# Patient Record
Sex: MALE | Race: WHITE
[De-identification: names, ages, dates, MRNs, and addresses within clinical notes are randomized per-mention and may not be internally consistent; named-entity substitution may affect disease eponyms.]

---

## 2019-01-05 ENCOUNTER — HOSPITAL ENCOUNTER (INPATIENT)
Dept: HOSPITAL 88 - ER | Age: 49
LOS: 4 days | Discharge: HOME | DRG: 871 | End: 2019-01-09
Attending: INTERNAL MEDICINE | Admitting: INTERNAL MEDICINE
Payer: SELF-PAY

## 2019-01-05 VITALS — DIASTOLIC BLOOD PRESSURE: 50 MMHG | SYSTOLIC BLOOD PRESSURE: 100 MMHG

## 2019-01-05 VITALS — DIASTOLIC BLOOD PRESSURE: 55 MMHG | SYSTOLIC BLOOD PRESSURE: 92 MMHG

## 2019-01-05 VITALS — SYSTOLIC BLOOD PRESSURE: 81 MMHG | DIASTOLIC BLOOD PRESSURE: 47 MMHG

## 2019-01-05 VITALS — DIASTOLIC BLOOD PRESSURE: 60 MMHG | SYSTOLIC BLOOD PRESSURE: 99 MMHG

## 2019-01-05 VITALS — SYSTOLIC BLOOD PRESSURE: 113 MMHG | DIASTOLIC BLOOD PRESSURE: 62 MMHG

## 2019-01-05 VITALS — HEIGHT: 75 IN | BODY MASS INDEX: 28.6 KG/M2 | WEIGHT: 230.03 LBS

## 2019-01-05 VITALS — DIASTOLIC BLOOD PRESSURE: 68 MMHG | SYSTOLIC BLOOD PRESSURE: 126 MMHG

## 2019-01-05 VITALS — DIASTOLIC BLOOD PRESSURE: 53 MMHG | SYSTOLIC BLOOD PRESSURE: 91 MMHG

## 2019-01-05 VITALS — DIASTOLIC BLOOD PRESSURE: 66 MMHG | SYSTOLIC BLOOD PRESSURE: 96 MMHG

## 2019-01-05 VITALS — DIASTOLIC BLOOD PRESSURE: 46 MMHG | SYSTOLIC BLOOD PRESSURE: 83 MMHG

## 2019-01-05 VITALS — DIASTOLIC BLOOD PRESSURE: 65 MMHG | SYSTOLIC BLOOD PRESSURE: 101 MMHG

## 2019-01-05 DIAGNOSIS — Z82.49: ICD-10-CM

## 2019-01-05 DIAGNOSIS — K92.2: ICD-10-CM

## 2019-01-05 DIAGNOSIS — R10.84: ICD-10-CM

## 2019-01-05 DIAGNOSIS — E87.2: ICD-10-CM

## 2019-01-05 DIAGNOSIS — R65.21: ICD-10-CM

## 2019-01-05 DIAGNOSIS — N17.9: ICD-10-CM

## 2019-01-05 DIAGNOSIS — G43.111: ICD-10-CM

## 2019-01-05 DIAGNOSIS — N30.00: ICD-10-CM

## 2019-01-05 DIAGNOSIS — I10: ICD-10-CM

## 2019-01-05 DIAGNOSIS — F11.20: ICD-10-CM

## 2019-01-05 DIAGNOSIS — A41.9: Primary | ICD-10-CM

## 2019-01-05 DIAGNOSIS — D64.9: ICD-10-CM

## 2019-01-05 DIAGNOSIS — Z87.891: ICD-10-CM

## 2019-01-05 DIAGNOSIS — K59.00: ICD-10-CM

## 2019-01-05 DIAGNOSIS — Z91.018: ICD-10-CM

## 2019-01-05 DIAGNOSIS — Z91.048: ICD-10-CM

## 2019-01-05 DIAGNOSIS — F10.20: ICD-10-CM

## 2019-01-05 DIAGNOSIS — K74.60: ICD-10-CM

## 2019-01-05 DIAGNOSIS — E86.0: ICD-10-CM

## 2019-01-05 DIAGNOSIS — Z86.19: ICD-10-CM

## 2019-01-05 DIAGNOSIS — Z88.8: ICD-10-CM

## 2019-01-05 LAB
ALBUMIN SERPL-MCNC: 3.7 G/DL (ref 3.5–5)
ALBUMIN/GLOB SERPL: 1.2 {RATIO} (ref 0.8–2)
ALP SERPL-CCNC: 58 IU/L (ref 40–150)
ALT SERPL-CCNC: 22 IU/L (ref 0–55)
AMPHETAMINES UR QL SCN>1000 NG/ML: NEGATIVE
AMYLASE SERPL-CCNC: 88 U/L (ref 25–125)
ANION GAP SERPL CALC-SCNC: 23.1 MMOL/L (ref 8–16)
APAP SERPL SCN-MCNC: 7 UG/ML (ref 10–30)
BACTERIA URNS QL MICRO: (no result) /HPF
BASOPHILS # BLD AUTO: 0.1 10*3/UL (ref 0–0.1)
BASOPHILS NFR BLD AUTO: 0.5 % (ref 0–1)
BENZODIAZ UR QL SCN: POSITIVE
BILIRUB UR QL: (no result)
BNP BLD-MCNC: 22.3 PG/ML (ref 0–100)
BUN SERPL-MCNC: 66 MG/DL (ref 7–26)
BUN/CREAT SERPL: 8 (ref 6–25)
CALCIUM SERPL-MCNC: 8.6 MG/DL (ref 8.4–10.2)
CHLORIDE SERPL-SCNC: 97 MMOL/L (ref 98–107)
CK MB SERPL-MCNC: 3.6 NG/ML (ref 0–5)
CK MB SERPL-MCNC: 4.4 NG/ML (ref 0–5)
CK SERPL-CCNC: 287 IU/L (ref 30–200)
CK SERPL-CCNC: 385 IU/L (ref 30–200)
CLARITY UR: (no result)
CO2 SERPL-SCNC: 21 MMOL/L (ref 22–29)
COLOR UR: YELLOW
CREAT UR-MCNC: 496.99 MG/DL (ref 63–166)
DEPRECATED APTT PLAS QN: 27 SECONDS (ref 23.8–35.5)
DEPRECATED INR PLAS: 0.95
DEPRECATED NEUTROPHILS # BLD AUTO: 6.9 10*3/UL (ref 2.1–6.9)
DEPRECATED RBC URNS MANUAL-ACNC: (no result) /HPF (ref 0–5)
EGFRCR SERPLBLD CKD-EPI 2021: 7 ML/MIN (ref 60–?)
EOSINOPHIL # BLD AUTO: 0.2 10*3/UL (ref 0–0.4)
EOSINOPHIL NFR BLD AUTO: 2.3 % (ref 0–6)
EPI CELLS URNS QL MICRO: (no result) /LPF
ERYTHROCYTE [DISTWIDTH] IN CORD BLOOD: 12.8 % (ref 11.7–14.4)
GLOBULIN PLAS-MCNC: 3.1 G/DL (ref 2.3–3.5)
GLUCOSE SERPLBLD-MCNC: 121 MG/DL (ref 74–118)
HCT VFR BLD AUTO: 34 % (ref 38.2–49.6)
HGB BLD-MCNC: 11.6 G/DL (ref 14–18)
KETONES UR QL STRIP.AUTO: (no result)
LEUKOCYTE ESTERASE UR QL STRIP.AUTO: NEGATIVE
LIPASE SERPL-CCNC: 50 U/L (ref 8–78)
LYMPHOCYTES # BLD: 1.3 10*3/UL (ref 1–3.2)
LYMPHOCYTES NFR BLD AUTO: 13.9 % (ref 18–39.1)
MAGNESIUM SERPL-MCNC: 2.4 MG/DL (ref 1.3–2.1)
MCH RBC QN AUTO: 31.4 PG (ref 28–32)
MCHC RBC AUTO-ENTMCNC: 34.1 G/DL (ref 31–35)
MCV RBC AUTO: 91.9 FL (ref 81–99)
MONOCYTES # BLD AUTO: 0.8 10*3/UL (ref 0.2–0.8)
MONOCYTES NFR BLD AUTO: 8.9 % (ref 4.4–11.3)
MUCOUS THREADS URNS QL MICRO: (no result)
NEUTS SEG NFR BLD AUTO: 74.1 % (ref 38.7–80)
NITRITE UR QL STRIP.AUTO: NEGATIVE
PCP UR QL SCN: NEGATIVE
PLATELET # BLD AUTO: 252 X10E3/UL (ref 140–360)
POTASSIUM SERPL-SCNC: 4.1 MMOL/L (ref 3.5–5.1)
PROT UR QL STRIP.AUTO: (no result)
PROTHROMBIN TIME: 13.6 SECONDS (ref 11.9–14.5)
RBC # BLD AUTO: 3.7 X10E6/UL (ref 4.3–5.7)
SALICYLATES SERPL-MCNC: < 5 MG/DL (ref 0–30)
SODIUM SERPL-SCNC: 137 MMOL/L (ref 136–145)
SODIUM UR-SCNC: 27 MMOL/L
SP GR UR STRIP: 1.03 (ref 1.01–1.02)
UROBILINOGEN UR STRIP-MCNC: 0.2 MG/DL (ref 0.2–1)
WBC #/AREA URNS HPF: (no result) /HPF (ref 0–5)

## 2019-01-05 PROCEDURE — 80048 BASIC METABOLIC PNL TOTAL CA: CPT

## 2019-01-05 PROCEDURE — 93005 ELECTROCARDIOGRAM TRACING: CPT

## 2019-01-05 PROCEDURE — 84436 ASSAY OF TOTAL THYROXINE: CPT

## 2019-01-05 PROCEDURE — 76770 US EXAM ABDO BACK WALL COMP: CPT

## 2019-01-05 PROCEDURE — 83735 ASSAY OF MAGNESIUM: CPT

## 2019-01-05 PROCEDURE — 84466 ASSAY OF TRANSFERRIN: CPT

## 2019-01-05 PROCEDURE — 82607 VITAMIN B-12: CPT

## 2019-01-05 PROCEDURE — 36415 COLL VENOUS BLD VENIPUNCTURE: CPT

## 2019-01-05 PROCEDURE — 71045 X-RAY EXAM CHEST 1 VIEW: CPT

## 2019-01-05 PROCEDURE — 80329 ANALGESICS NON-OPIOID 1 OR 2: CPT

## 2019-01-05 PROCEDURE — 82570 ASSAY OF URINE CREATININE: CPT

## 2019-01-05 PROCEDURE — 84479 ASSAY OF THYROID (T3 OR T4): CPT

## 2019-01-05 PROCEDURE — 84443 ASSAY THYROID STIM HORMONE: CPT

## 2019-01-05 PROCEDURE — 84484 ASSAY OF TROPONIN QUANT: CPT

## 2019-01-05 PROCEDURE — 80307 DRUG TEST PRSMV CHEM ANLYZR: CPT

## 2019-01-05 PROCEDURE — 83540 ASSAY OF IRON: CPT

## 2019-01-05 PROCEDURE — 82150 ASSAY OF AMYLASE: CPT

## 2019-01-05 PROCEDURE — 82270 OCCULT BLOOD FECES: CPT

## 2019-01-05 PROCEDURE — 83605 ASSAY OF LACTIC ACID: CPT

## 2019-01-05 PROCEDURE — 51700 IRRIGATION OF BLADDER: CPT

## 2019-01-05 PROCEDURE — 82553 CREATINE MB FRACTION: CPT

## 2019-01-05 PROCEDURE — 99285 EMERGENCY DEPT VISIT HI MDM: CPT

## 2019-01-05 PROCEDURE — 85045 AUTOMATED RETICULOCYTE COUNT: CPT

## 2019-01-05 PROCEDURE — 94660 CPAP INITIATION&MGMT: CPT

## 2019-01-05 PROCEDURE — 74176 CT ABD & PELVIS W/O CONTRAST: CPT

## 2019-01-05 PROCEDURE — 85730 THROMBOPLASTIN TIME PARTIAL: CPT

## 2019-01-05 PROCEDURE — 87493 C DIFF AMPLIFIED PROBE: CPT

## 2019-01-05 PROCEDURE — 81001 URINALYSIS AUTO W/SCOPE: CPT

## 2019-01-05 PROCEDURE — 87040 BLOOD CULTURE FOR BACTERIA: CPT

## 2019-01-05 PROCEDURE — 85025 COMPLETE CBC W/AUTO DIFF WBC: CPT

## 2019-01-05 PROCEDURE — 87086 URINE CULTURE/COLONY COUNT: CPT

## 2019-01-05 PROCEDURE — 82550 ASSAY OF CK (CPK): CPT

## 2019-01-05 PROCEDURE — 83880 ASSAY OF NATRIURETIC PEPTIDE: CPT

## 2019-01-05 PROCEDURE — 84300 ASSAY OF URINE SODIUM: CPT

## 2019-01-05 PROCEDURE — 80320 DRUG SCREEN QUANTALCOHOLS: CPT

## 2019-01-05 PROCEDURE — 85610 PROTHROMBIN TIME: CPT

## 2019-01-05 PROCEDURE — 80053 COMPREHEN METABOLIC PANEL: CPT

## 2019-01-05 PROCEDURE — 70450 CT HEAD/BRAIN W/O DYE: CPT

## 2019-01-05 PROCEDURE — 83690 ASSAY OF LIPASE: CPT

## 2019-01-05 RX ADMIN — DEXTROSE AND SODIUM CHLORIDE SCH MLS/HR: 5; 900 INJECTION, SOLUTION INTRAVENOUS at 22:15

## 2019-01-05 RX ADMIN — CEFEPIME SCH MLS/HR: 1 INJECTION, SOLUTION INTRAVENOUS at 16:00

## 2019-01-05 NOTE — NUR
Patient arrived from ER, pt has altered mentation and shakiness, was sent here from alcohol 
detox center. No order for Benzodiazepines from ER.

## 2019-01-05 NOTE — XMS REPORT
Clinical Summary

                             Created on: 2019



JC PRESSLEY

External Reference #: [id number]

: 1970

Sex: Male



Demographics







                          Address                   [address] [place]  [address]

 

                          Home Phone                [phone]

 

                          Preferred Language        Unknown

 

                          Marital Status            

 

                          Buddhist Affiliation     PRS

 

                          Race                      Unknown

 

                          Ethnic Group              Unknown





Author







                          Author                    [facility]

 

                          Organization              [facility]

 

                          Address                   Unknown

 

                          Phone                     Unavailable







Support







                Name            Relationship    Address         Phone

 

                    Jose Zeus        PEREZ                [address]

[place]  [address]                      [phone]







Care Team Providers







                    Care Team Member Name    Role                Phone

 

                          PCP                       Unavailable







Allergies

No Known Allergies



Medications







                          End Date                  Status



              Medication     Sig          Dispensed     Refills      Start  



                                         Date  

 

                                                    Active



              acetaminophen-codeine     Take 1 tablet     20 tablet     0              



                     (TYLENOL/CODEINE #3)     by mouth            8  



                           300-30 mg per             every 4 hours     



                           tabletIndications: Rib     as needed for     



                           pain on right side        Pain.     







Active Problems







 



                           Problem                   Noted Date

 

 



                           Rib pain on right side     2018







Encounters







                          Care Team                 Description



                     Date                Type                Specialty  

 

                                        



Francesco Bowen MD                    Rib pain on right side (Primary Dx)



                     2018          Emergency           Emergency Medicine  



                                         -    



                                         2018    



after 2018



Social History







                                        Date



                 Tobacco Use     Types           Packs/Day       Years Used 

 

                                         



                                         Current Every Day Smoker    

 

    



                                         Smokeless Tobacco: Never   



                                         Used   









   



                 Alcohol Use     Drinks/Week     oz/Week         Comments

 

   



                                         No   









 



                           Sex Assigned at Birth     Date Recorded

 

 



                                         Not on file 









                                        Industry



                           Job Start Date            Occupation 

 

                                        Not on file



                           Not on file               Not on file 









                                        Travel End



                           Travel History            Travel Start 

 





                                         No recent travel history available.







Last Filed Vital Signs







                                        Time Taken



                           Vital Sign                Reading 

 

                                        2018  6:00 AM CDT



                           Blood Pressure            168/98 

 

                                        2018  6:00 AM CDT



                           Pulse                     100 

 

                                        2018  6:00 AM CDT



                           Temperature               36.4 C (97.5 F) 

 

                                        2018  6:00 AM CDT



                           Respiratory Rate          18 

 

                                        2018  6:00 AM CDT



                           Oxygen Saturation         98% 

 

                                        -



                           Inhaled Oxygen            - 



                                         Concentration  

 

                                        -



                           Weight                    - 

 

                                        -



                           Height                    - 

 

                                        -



                           Body Mass Index           - 







Plan of Treatment







   



                 Health Maintenance     Due Date        Last Done       Comments

 

   



                           IMM Influenza Seasonal     10/01/2018  



                                         Oct to March (>/=19 yrs)   







Procedures







                                        Comments



                 Procedure Name     Priority        Date/Time       Associated Diagnosis 

 

                                        



Results for this procedure are in the results section.



                 XRAY RIBS UNILATERAL 2     STAT            2018      Rib pain on right side 



                           VIEWS                     4:13 AM CDT  

 

                                        



Results for this procedure are in the results section.



                 XRAY CHEST 2 VIEWS     STAT            2018      Rib pain on right side 



                                         6:17 PM CDT  



after 2018



Results

* XRAY RIBS UNILATERAL 2 VIEWS (2018  4:13 AM CDT)





 



                           Impressions               Performed At

 

 



                           IMPRESSION:               [facility]



                                         No displaced rib fracture. 



                                         Dictated By: Omi Hernandez MD, 2018 5:04 AM 



                                         I have reviewed the study and agree with the findings in this report. 



                                         Signed By: Erin Bennett MD, 2018 5:17 AM 









 



                           Narrative                 Performed At

 

 



                           EXAM: Right rib series: 4 view(s)     [facility]



                                         HISTORY: right rib pain 



                                         COMPARISON: Chest x-ray on 2018. 



                                         DISCUSSION: 



                                         No acute displaced fracture. 



                                         No pneumothorax. 



                                         Prominent right epicardial fat pad.Right middle lobe density and lungs 



                                         better assessed on prior chest two view xray. 



                                         The soft tissues are unremarkable. 









                                        Procedure Note

 

                                        



Interface, Rad/Mammog In - 2018  5:22 AM CDT



EXAM: Right rib series: 4 view(s)



HISTORY: right rib pain

COMPARISON: Chest x-ray on 2018.



DISCUSSION:



No acute displaced fracture.

No pneumothorax. 

Prominent right epicardial fat pad.  Right middle lobe density and lungs

better assessed on prior chest two view xray.

The soft tissues are unremarkable.



IMPRESSION

IMPRESSION:

No displaced rib fracture.



Dictated By: Omi Hernandez MD, 2018 5:04 AM



I have reviewed the study and agree with the findings in this report.



Signed By: Erin Bennett MD, 2018 5:17 AM











   



                 Performing Organization     Address         City/State/Zipcode     Phone Number

 

   



                                         [facility]   





* XRAY CHEST 2 VIEWS (2018  6:17 PM CDT)





 



                           Impressions               Performed At

 

 



                           IMPRESSION:               [facility]



                                         Mild irregularity of the right seventh rib may be due to mildly 



                                         displaced fracture. Recommend further evaluation with right-sided ribs 



                                         series. 



                                         Right middle lobe airspace opacity obscuring the right heart border due 



                                         to atelectasis, contusion, or pneumonia. Recommend follow-up chest 



                                         radiograph in 4-6 weeks. 



                                         Discussed findings with Dr. Menezes at 8:00 PM on 2018. 



                                         Dictated By: Omi Hernandez MD, 2018 8:02 PM 



                                         I have reviewed the study and agree with the findings in this report. 



                                         Signed By: Tiffani Veloz MD, 2018 8:03 PM 









 



                           Narrative                 Performed At

 

 



                           EXAMINATION:XRAY CHEST 2 VIEWS     [facility]



                                         INDICATION: right rib pain 



                                         COMPARISON:None 



                                          



                                         FINDINGS: 



                                         TUBES and LINES:None. 



                                         LUNGS:Right middle lobe airspace opacity obscuring the right heart 



                                         border. Bibasilar segmental atelectasis. 



                                         PLEURA:No pleural effusion or pneumothorax. 



                                         HEART AND MEDIASTINUM: Bilateral opacified basilar heart borders. The 



                                         cardiac silhouette silhouette is mildly enlarged. 



                                         BONES AND SOFT TISSUES:Mild irregularity of the lateral aspect of the 



                                         right seventh rib.Soft tissues are unremarkable. 



                                         UPPER ABDOMEN: No free air under the diaphragm. 









                                        Procedure Note

 

                                        



Interface, Rad/Mammog In - 2018  8:09 PM CDT



EXAMINATION:  XRAY CHEST 2 VIEWS    



INDICATION: right rib pain  



COMPARISON:  None

     

FINDINGS:

TUBES and LINES:  None.



LUNGS:  Right middle lobe airspace opacity obscuring the right heart

border. Bibasilar segmental atelectasis.



PLEURA:  No pleural effusion or pneumothorax.



HEART AND MEDIASTINUM: Bilateral opacified basilar heart borders. The

cardiac silhouette silhouette is mildly enlarged.



BONES AND SOFT TISSUES:  Mild irregularity of the lateral aspect of the

right seventh rib.  Soft tissues are unremarkable.



UPPER ABDOMEN: No free air under the diaphragm.    



IMPRESSION

IMPRESSION: 

Mild irregularity of the right seventh rib may be due to mildly

displaced fracture. Recommend further evaluation with right-sided ribs

series.



Right middle lobe airspace opacity obscuring the right heart border due

to atelectasis, contusion, or pneumonia. Recommend follow-up chest

radiograph in 4-6 weeks.





Discussed findings with Dr. Menezes at 8:00 PM on 2018.



Dictated By: Omi Hernandez MD, 2018 8:02 PM



I have reviewed the study and agree with the findings in this report.



Signed By: Tiffani Veloz MD, 2018 8:03 PM











   



                 Performing Organization     Address         City/State/Zipcode     Phone Number

 

   



                                         [facility]   





after 2018



Insurance







                                        Type



            Payer      Benefit     Subscriber ID     Effective     Phone      Address 



                           Plan /                    Dates   



                                         Group     

 

                                         



            [facility] SELF-PAY     [facility]       xxxxxxxxx     2018-P     [phone]     [address] [place] [address] 









     



            Guarantor Name     Account     Relation to     Date of     Phone      Billing Address



                     Type                Patient             Birth  

 

     



            Jc Pressley     Personal/F     Self       1970     [phone] [address]               (Home)              [place]



                           [phone]              [place] [address]



                                         (Work)

## 2019-01-05 NOTE — NUR
DR. PETERSON AT BEDSIDE TO TALK WITH PT REGARDING LAB RESULTS AND THE NEED TO 
INSERT LIMA CATHETER. PT VERBALIZES UNDERSTANDING. LIMA CATH INSERTED AS 
ORDERED. IVF'S INFUSING AS ORDERED. PT MEDICATED AS ORDERED. PT STILL HAVING 
SEVERE LUQ PAIN. DR. PETERSON AWARE OF THIS. PT INFORMED PAIN MED CAN'T BE GIVEN 
AT THIS TIME DUE TO LOW B/P 80/60'S. PT DRIFTING OFF TO SLEEP DURING 
CONVERSATION WITH MYSELF AND OTHER HOSPITAL STAFF. PT ENCOURAGED TO CONTINUE 
DRINKING CONTRAST FOR CT SCAN.

## 2019-01-05 NOTE — XMS REPORT
Clinical Summary

                             Created on: 2019



JC PRESSLEY

External Reference #: [id number]

: 1970

Sex: Male



Demographics







                          Address                   [address] [place]  [address]

 

                          Home Phone                [phone]

 

                          Preferred Language        Unknown

 

                          Marital Status            

 

                          Scientology Affiliation     PRS

 

                          Race                      Unknown

 

                          Ethnic Group              Unknown





Author







                          Author                    [facility]

 

                          Organization              [facility]

 

                          Address                   Unknown

 

                          Phone                     Unavailable







Support







                Name            Relationship    Address         Phone

 

                    Jose Zeus        PEREZ                [address]

[place]  [address]                      [phone]







Care Team Providers







                    Care Team Member Name    Role                Phone

 

                          PCP                       Unavailable







Allergies

No Known Allergies



Medications







                          End Date                  Status



              Medication     Sig          Dispensed     Refills      Start  



                                         Date  

 

                                                    Active



              acetaminophen-codeine     Take 1 tablet     20 tablet     0              



                     (TYLENOL/CODEINE #3)     by mouth            8  



                           300-30 mg per             every 4 hours     



                           tabletIndications: Rib     as needed for     



                           pain on right side        Pain.     







Active Problems







 



                           Problem                   Noted Date

 

 



                           Rib pain on right side     2018







Encounters







                          Care Team                 Description



                     Date                Type                Specialty  

 

                                        



Francesco Bowen MD                    Rib pain on right side (Primary Dx)



                     2018          Emergency           Emergency Medicine  



                                         -    



                                         2018    



after 2018



Social History







                                        Date



                 Tobacco Use     Types           Packs/Day       Years Used 

 

                                         



                                         Current Every Day Smoker    

 

    



                                         Smokeless Tobacco: Never   



                                         Used   









   



                 Alcohol Use     Drinks/Week     oz/Week         Comments

 

   



                                         No   









 



                           Sex Assigned at Birth     Date Recorded

 

 



                                         Not on file 









                                        Industry



                           Job Start Date            Occupation 

 

                                        Not on file



                           Not on file               Not on file 









                                        Travel End



                           Travel History            Travel Start 

 





                                         No recent travel history available.







Last Filed Vital Signs







                                        Time Taken



                           Vital Sign                Reading 

 

                                        2018  6:00 AM CDT



                           Blood Pressure            168/98 

 

                                        2018  6:00 AM CDT



                           Pulse                     100 

 

                                        2018  6:00 AM CDT



                           Temperature               36.4 C (97.5 F) 

 

                                        2018  6:00 AM CDT



                           Respiratory Rate          18 

 

                                        2018  6:00 AM CDT



                           Oxygen Saturation         98% 

 

                                        -



                           Inhaled Oxygen            - 



                                         Concentration  

 

                                        -



                           Weight                    - 

 

                                        -



                           Height                    - 

 

                                        -



                           Body Mass Index           - 







Plan of Treatment







   



                 Health Maintenance     Due Date        Last Done       Comments

 

   



                           IMM Influenza Seasonal     10/01/2018  



                                         Oct to March (>/=19 yrs)   







Procedures







                                        Comments



                 Procedure Name     Priority        Date/Time       Associated Diagnosis 

 

                                        



Results for this procedure are in the results section.



                 XRAY RIBS UNILATERAL 2     STAT            2018      Rib pain on right side 



                           VIEWS                     4:13 AM CDT  

 

                                        



Results for this procedure are in the results section.



                 XRAY CHEST 2 VIEWS     STAT            2018      Rib pain on right side 



                                         6:17 PM CDT  



after 2018



Results

* XRAY RIBS UNILATERAL 2 VIEWS (2018  4:13 AM CDT)





 



                           Impressions               Performed At

 

 



                           IMPRESSION:               [facility]



                                         No displaced rib fracture. 



                                         Dictated By: Omi Hernandez MD, 2018 5:04 AM 



                                         I have reviewed the study and agree with the findings in this report. 



                                         Signed By: Erin Bennett MD, 2018 5:17 AM 









 



                           Narrative                 Performed At

 

 



                           EXAM: Right rib series: 4 view(s)     [facility]



                                         HISTORY: right rib pain 



                                         COMPARISON: Chest x-ray on 2018. 



                                         DISCUSSION: 



                                         No acute displaced fracture. 



                                         No pneumothorax. 



                                         Prominent right epicardial fat pad.Right middle lobe density and lungs 



                                         better assessed on prior chest two view xray. 



                                         The soft tissues are unremarkable. 









                                        Procedure Note

 

                                        



Interface, Rad/Mammog In - 2018  5:22 AM CDT



EXAM: Right rib series: 4 view(s)



HISTORY: right rib pain

COMPARISON: Chest x-ray on 2018.



DISCUSSION:



No acute displaced fracture.

No pneumothorax. 

Prominent right epicardial fat pad.  Right middle lobe density and lungs

better assessed on prior chest two view xray.

The soft tissues are unremarkable.



IMPRESSION

IMPRESSION:

No displaced rib fracture.



Dictated By: Omi Hernandez MD, 2018 5:04 AM



I have reviewed the study and agree with the findings in this report.



Signed By: Erin Bennett MD, 2018 5:17 AM











   



                 Performing Organization     Address         City/State/Zipcode     Phone Number

 

   



                                         [facility]   





* XRAY CHEST 2 VIEWS (2018  6:17 PM CDT)





 



                           Impressions               Performed At

 

 



                           IMPRESSION:               [facility]



                                         Mild irregularity of the right seventh rib may be due to mildly 



                                         displaced fracture. Recommend further evaluation with right-sided ribs 



                                         series. 



                                         Right middle lobe airspace opacity obscuring the right heart border due 



                                         to atelectasis, contusion, or pneumonia. Recommend follow-up chest 



                                         radiograph in 4-6 weeks. 



                                         Discussed findings with Dr. Menezes at 8:00 PM on 2018. 



                                         Dictated By: Omi Hernandez MD, 2018 8:02 PM 



                                         I have reviewed the study and agree with the findings in this report. 



                                         Signed By: Tiffani Veloz MD, 2018 8:03 PM 









 



                           Narrative                 Performed At

 

 



                           EXAMINATION:XRAY CHEST 2 VIEWS     [facility]



                                         INDICATION: right rib pain 



                                         COMPARISON:None 



                                          



                                         FINDINGS: 



                                         TUBES and LINES:None. 



                                         LUNGS:Right middle lobe airspace opacity obscuring the right heart 



                                         border. Bibasilar segmental atelectasis. 



                                         PLEURA:No pleural effusion or pneumothorax. 



                                         HEART AND MEDIASTINUM: Bilateral opacified basilar heart borders. The 



                                         cardiac silhouette silhouette is mildly enlarged. 



                                         BONES AND SOFT TISSUES:Mild irregularity of the lateral aspect of the 



                                         right seventh rib.Soft tissues are unremarkable. 



                                         UPPER ABDOMEN: No free air under the diaphragm. 









                                        Procedure Note

 

                                        



Interface, Rad/Mammog In - 2018  8:09 PM CDT



EXAMINATION:  XRAY CHEST 2 VIEWS    



INDICATION: right rib pain  



COMPARISON:  None

     

FINDINGS:

TUBES and LINES:  None.



LUNGS:  Right middle lobe airspace opacity obscuring the right heart

border. Bibasilar segmental atelectasis.



PLEURA:  No pleural effusion or pneumothorax.



HEART AND MEDIASTINUM: Bilateral opacified basilar heart borders. The

cardiac silhouette silhouette is mildly enlarged.



BONES AND SOFT TISSUES:  Mild irregularity of the lateral aspect of the

right seventh rib.  Soft tissues are unremarkable.



UPPER ABDOMEN: No free air under the diaphragm.    



IMPRESSION

IMPRESSION: 

Mild irregularity of the right seventh rib may be due to mildly

displaced fracture. Recommend further evaluation with right-sided ribs

series.



Right middle lobe airspace opacity obscuring the right heart border due

to atelectasis, contusion, or pneumonia. Recommend follow-up chest

radiograph in 4-6 weeks.





Discussed findings with Dr. Menezes at 8:00 PM on 2018.



Dictated By: Omi Hernandez MD, 2018 8:02 PM



I have reviewed the study and agree with the findings in this report.



Signed By: Tiffani Veloz MD, 2018 8:03 PM











   



                 Performing Organization     Address         City/State/Zipcode     Phone Number

 

   



                                         [facility]   





after 2018



Insurance







                                        Type



            Payer      Benefit     Subscriber ID     Effective     Phone      Address 



                           Plan /                    Dates   



                                         Group     

 

                                         



            [facility] SELF-PAY     [facility]       xxxxxxxxx     2018-P     [phone]     [address] [place] [address] 









     



            Guarantor Name     Account     Relation to     Date of     Phone      Billing Address



                     Type                Patient             Birth  

 

     



            Jc Pressley     Personal/F     Self       1970     [phone] [address]               (Home)              [employer]



                           [phone]              [place] [address]



                                         (Work)

## 2019-01-05 NOTE — XMS REPORT
Clinical Summary

                             Created on: 2019



Casey Garciaaj Sudhir

External Reference #: [id number]

: 1970

Sex: Male



Demographics







                          Address                   [address] [place]  [address]

 

                          Home Phone                [phone]

 

                          Preferred Language        English

 

                          Marital Status            

 

                          Mandaeism Affiliation     Unknown

 

                          Race                      White

 

                          Ethnic Group              Non-





Author







                          Author                    [facility]

 

                          Organization              [facility]

 

                          Address                   Unknown

 

                          Phone                     Unavailable







Support







                Name            Relationship    Address         Phone

 

                Marry Garcia    [employer]            Unknown         [phone]







Care Team Providers







                    Care Team Member Name    Role                Phone

 

                    Gilford, Timberly MD    PCP                 [phone]







Allergies







                                        Comments



                 Active Allergy     Reactions       Severity        Noted Date 

 

                                        



"severe depression"



                 Rofecoxib       Other (See      High            2018 



                                         Comments)   







Medications







                          End Date                  Status



              Medication     Sig          Dispensed     Refills      Start  



                                         Date  

 

                          2019                Active



              lisinopril     Take 2       60 tablet     0              



                     (PRINIVIL,ZESTRIL) 20 mg     tablets (40         8  



                           tablet                    mg total) by     



                                         mouth daily     



                                         for 30 days.     

 

                          2019                Active



              thiamine (vitamin B-1)     Take 1 tablet     30 tablet     0              



                     100 MG tablet       (100 mg             8  



                                         total) by     



                                         mouth daily     



                                         for 30 days.     

 

                          2018                Discontinued



                 clonIDINE (CATAPRES) 0.1     TAKE 1 TABLET      2                 



                     MG tablet           BY MOUTH            8  



                                         EVERY DAY AS     



                                         NEEDED FOR     



                                         BLOOD     



                                         PRESSURE     



                                         >170/110     

 

                          2018                Discontinued



                     dextroamphetamine-ampheta     Adderall            0   



                                         mine (ADDERALL) 20 mg      



                                         tablet      

 

                          2018                Discontinued



                     lisinopril          Take 20 mg by       0   



                           (PRINIVIL,ZESTRIL) 20 mg     mouth daily.     



                                         tablet      

 

                          10/09/2018                



              sucralfate (CARAFATE) 100     Take 10 mL (1     1200 mL      0              



                     mg/mL suspension     g total) by         8  



                                         mouth 4     



                                         (four) times     



                                         a day for 30     



                                         days.     

 

                          10/09/2018                



              lansoprazole (PREVACID)     Take 1       30 capsule     0              



                     30 MG capsule       capsule (30         8  



                                         mg total) by     



                                         mouth daily     



                                         for 30 days.     

 

                          2018                



              famotidine (PEPCID) 20 MG     Take 1 tablet     30 tablet     0              



                     tablet              (20 mg total)       8  



                                         by mouth 2     



                                         (two) times a     



                                         day for 15     



                                         days.     

 

                          2018                



              traMADol 100 mg     Take 100 mg     4 capsule     0              



                     capsule,ER biphase 24 hr     by mouth            8  



                           25-75                     daily for 4     



                                         days.     

 

                          2018                



              sucralfate (CARAFATE) 100     Take 10 mL (1     200 mL       0              



                     mg/mL suspension     g total) by         8  



                                         mouth 4     



                                         (four) times     



                                         a day before     



                                         meals and     



                                         nightly for 5     



                                         days.     

 

                          2018                



              acetaminophen-codeine     Take 1 tablet     14 tablet     0              



                     (TYLENOL WITH CODEINE #3)     by mouth            8  



                           300-30 mg per tablet      every 6 (six)     



                                         hours as     



                                         needed for     



                                         moderate pain     



                                         for up to 5     



                                         days.     

 

                          2018                



              metroNIDAZOLE (FLAGYL)     Take 1 tablet     30 tablet     0              



                     500 MG tablet       (500 mg             8  



                                         total) by     



                                         mouth 3     



                                         (three) times     



                                         a day for 10     



                                         days.     







Active Problems







 



                           Problem                   Noted Date

 

 



                           Clostridium difficile infection     2018







Encounters







                          Care Team                 Description



                     Date                Type                Specialty  

 

                                        



[facility]        



                     [facility]          Orders Only         Internal Medicine  

 

                                        



Rehrer, DO Dilia Rodriguez Martina C., MD                 Intractable abdominal pain (Primary Dx); 

Essential hypertension



                     2018          Emergency           General Internal Medicine  



                                         -    



                                         2018    

 

                                        



Clark Abarca MD                Abdominal pain, unspecified abdominal location (Primary

 Dx)



                     2018          Emergency           Emergency Medicine  



after 2018



Social History







                                        Date



                 Tobacco Use     Types           Packs/Day       Years Used 

 

                                        Quit: 2018



                           Former Smoker             1 

 

    



                                         Smokeless Tobacco: Former   



                                         User   









   



                 Alcohol Use     Drinks/Week     oz/Week         Comments

 

   



                           Yes                       - last drink yesterday yong









 



                           Sex Assigned at Birth     Date Recorded

 

 



                                         Not on file 









                                        Industry



                           Job Start Date            Occupation 

 

                                        Not on file



                           Not on file               Not on file 









                                        Travel End



                           Travel History            Travel Start 

 





                                         No recent travel history available.







Last Filed Vital Signs







                                        Time Taken



                           Vital Sign                Reading 

 

                                        2018  2:18 PM CST



                           Blood Pressure            163/98 

 

                                        2018 12:33 PM CST



                           Pulse                     89 

 

                                        2018 12:33 PM CST



                           Temperature               35.8 C (96.5 F) 

 

                                        2018 12:33 PM CST



                           Respiratory Rate          18 

 

                                        2018 12:33 PM CST



                           Oxygen Saturation         95% 

 

                                        -



                           Inhaled Oxygen            - 



                                         Concentration  

 

                                        2018 10:13 AM CST



                           Weight                    104 kg (230 lb) 

 

                                        2018 10:13 AM CST



                           Height                    188 cm (6' 2") 

 

                                        2018 10:13 AM CST



                           Body Mass Index           29.53 







Plan of Treatment







   



                 Health Maintenance     Due Date        Last Done       Comments

 

   



                           INFLUENZA VACCINE         2018  







Procedures







                                        Comments



                 Procedure Name     Priority        Date/Time       Associated Diagnosis 

 

                                        



Results for this procedure are in the results section.



                     GASTROINTESTINAL PANEL     Routine             2018  



                                         11:00 AM CST  

 

                                        



Results for this procedure are in the results section.



                     LACTIC ACID LEVEL     Routine             2018  



                                         8:05 AM CST  

 

                                        



Results for this procedure are in the results section.



                     ESTIMATED GFR       Routine             2018  



                                         4:00 AM CST  

 

                                        



Results for this procedure are in the results section.



                     BASIC METABOLIC PANEL     Routine             2018  



                                         4:00 AM CST  

 

                                        



Results for this procedure are in the results section.



                     HC COMPLETE BLD COUNT     Routine             2018  



                           W/AUTO DIFF               4:00 AM CST  

 

                                        



Results for this procedure are in the results section.



                     LACTIC ACID LEVEL     Routine             2018  



                                         12:30 AM CST  

 

                                        



Results for this procedure are in the results section.



                     XR CHEST 1 VW PORTABLE     STAT                2018  



                                         9:21 PM CST  

 

                                        



Results for this procedure are in the results section.



                     ECG 12-LEAD         STAT                2018  



                                         8:47 PM CST  

 

                                        



Results for this procedure are in the results section.



                     LACTIC ACID LEVEL     STAT                2018  



                                         6:24 PM CST  

 

                                        



Results for this procedure are in the results section.



                     TROPONIN            STAT                2018  



                                         6:24 PM CST  

 

                                        



Results for this procedure are in the results section.



                     LACTIC ACID LEVEL, SEPSIS     Timed               2018  



                           - NOW AND REPEAT 2X EVERY      5:32 PM CST  



                                         3 HOURS    

 

                                        



Results for this procedure are in the results section.



                     LACTIC ACID LEVEL, SEPSIS     Timed               2018  



                           - NOW AND REPEAT 2X EVERY      2:11 PM CST  



                                         3 HOURS    

 

                                        



Results for this procedure are in the results section.



                     KS CRITICAL CARE, E/M     Routine             2018  



                           30-74 MINUTES             2:08 PM CST  

 

                                        



Results for this procedure are in the results section.



                     URINALYSIS          STAT                2018  



                                         2:06 PM CST  

 

                                        



Results for this procedure are in the results section.



                     GRAM STAIN          Routine             2018  



                                         2:06 PM CST  

 

                                        



Results for this procedure are in the results section.



                     URINE CULTURE       Routine             2018  



                                         2:06 PM CST  

 

                                        



Results for this procedure are in the results section.



                     CT ABDOMEN PELVIS W     STAT                2018  



                           CONTRAST                  11:21 AM CST  

 

                                        



Results for this procedure are in the results section.



                     ESTIMATED GFR       STAT                2018  



                                         10:07 AM CST  

 

                                        



Results for this procedure are in the results section.



                     LACTIC ACID LEVEL, SEPSIS     STAT                2018  



                           - NOW AND REPEAT 2X EVERY      10:07 AM CST  



                                         3 HOURS    

 

                                        



Results for this procedure are in the results section.



                     AMYLASE LEVEL       STAT                2018  



                                         10:07 AM CST  

 

                                        



Results for this procedure are in the results section.



                     LIPASE LEVEL        STAT                2018  



                                         10:07 AM CST  

 

                                        



Results for this procedure are in the results section.



                     COMPREHENSIVE METABOLIC     STAT                2018  



                           PANEL                     10:07 AM CST  

 

                                        



Results for this procedure are in the results section.



                     HC COMPLETE BLD COUNT     STAT                2018  



                           W/AUTO DIFF               10:07 AM CST  

 

                                        



Results for this procedure are in the results section.



                     CT ABDOMEN PELVIS W     STAT                2018  



                           CONTRAST                  11:50 AM CDT  

 

                                        



Results for this procedure are in the results section.



                     HC COMPLETE BLD COUNT     STAT                2018  



                           W/AUTO DIFF               10:46 AM CDT  

 

                                        



Results for this procedure are in the results section.



                     ZZESTIMATED GFR     STAT                2018  



                                         10:43 AM CDT  

 

                                        



Results for this procedure are in the results section.



                     LIPASE LEVEL        STAT                2018  



                                         10:43 AM CDT  

 

                                        



Results for this procedure are in the results section.



                     AMYLASE LEVEL       STAT                2018  



                                         10:43 AM CDT  

 

                                        



Results for this procedure are in the results section.



                     HEPATIC FUNCTION PANEL     STAT                2018  



                                         10:43 AM CDT  

 

                                        



Results for this procedure are in the results section.



                     BASIC METABOLIC PANEL     STAT                2018  



                                         10:43 AM CDT  



after 2018



Results

* Gastrointestinal panel (2018 11:00 AM CST)





   



                 Component       Value           Ref Range       Performed At

 

   



                     Gastrointestinal panel     Positive for C. difficile      [facility]



                                         -----------------------  



                                         Negative for all other  



                                         pathogens tested:  



                                         Negative for Salmonella  



                                         Negative for Campylobacter  



                                         Negative for Diarrheagenic E  



                                         coli/Shigella  



                                         Negative for Shiga-like  



                                         toxin-producing E coli  



                                         Negative for Plesiomonas  



                                         shigelloides  



                                         Negative for Yersinia  



                                         enterocolitica  



                                         Negative for Vibrio species  



                                         Negative for Cryptosporidium  



                                         Negative for Giardia lamblia  



                                         Negative for Cyclospora  



                                         cayeteanensis  



                                         Negative for Entamoeba  



                                         histolytica  



                                         Negative for Adenovirus F  



                                         40/41  



                                         Negative for Astrovirus  



                                         Negative for Norovirus GI/GII  



                                         Negative for Rotavirus A  



                                         Negative for Sapovirus  



                                         Negative for E coli 0157  



                                         This real-time PCR assay  



                                         detects the presence of  



                                         nucleic  



                                         acids (RNA or DNA) for the  



                                         gastrointestinal pathogens  



                                         listed.  



                                         A result of "Not-detected"  



                                         does not exclude the  



                                         possibility  



                                         of the presence of one or more  



                                         pathogens at concentrations  



                                         less than the detectable  



                                         limits of the assay.  



                                         (A)  



                                         Comment:  



                                         Specimen Information  



                                         Specimen Source: Stool  



                                         Specimen Site: Nonpreserved  













                                         Specimen

 





                                         Stool - Nonpreserved









   



                 Performing Organization     Address         City/[facility]     Phone Number

 

   



                     [facility] [place] [address] 



                                         PATHOLOGY [address]   





* Lactic acid level (2018  8:05 AM CST)



Only the most recent of 3 results within the time period is included.





   



                 Component       Value           Ref Range       Performed At

 

   



                 Lactic acid     1.5             0.5 - 2.2 mmol/L     [facility]













                                         Specimen

 





                                         Blood









   



                 Performing Organization     Address         City/State/[facility]     Phone Number

 

   



                     [facility] [place] [address] 



                                         PATHOLOGY [address]   





* Estimated GFR (2018  4:00 AM CST)



Only the most recent of 2 results within the time period is included.





   



                 Component       Value           Ref Range       Performed At

 

   



                 Estimated GFR     >=90            mL/min/1.73 m2     [facility]



                           Comment:                  HOSPITAL



                                         CatergoryUnitsInte  



                                         rpretation  



                                         G1  



                                         >=90 Normal or high  



                                         G2  



                                         60-89Mildly decreased  



                                         [id number]  



                                         Mildly to moderately  



                                         decreased  



                                         [id number]  



                                         Moderately to severely  



                                         decreased  



                                         G4  



                                         15-29Severely  



                                         decreased  



                                         G5  



                                         <15Kidney failure  



                                         The eGFR was calculated using  



                                         the Chronic Kidney Disease  



                                         Epidemiology Collaboration  



                                         (CKD-EPI) equation.  



                                         Interpretation is based on  



                                         recommendations of the  



                                         [employer]  



                                         [employer]  



                                         Outcomes Quality  



                                         Initiative ([id number])  



                                         published in 2014.  













                                         Specimen

 





                                         Plasma specimen









   



                 Performing Organization     Address         City[facility]     Phone Number

 

   



                     [facility] [place] [address] 



                                         PATHOLOGY AND [facility]   



                                         MEDICINE   

 

   



                     [address]   





* CBC with platelet and differential (2018  4:00 AM CST)



Only the most recent of 3 results within the time period is included.





   



                 Component       Value           Ref Range       Performed At

 

   



                 WBC             6.42            4.50 - 11.00 k/uL     [facility]

 

   



                 RBC             3.84 (L)        4.40 - 6.00 m/uL     [facility]

 

   



                 HGB             11.9 (L)        14.0 - 18.0 g/dL     [facility]

 

   



                 HCT             35.2 (L)        41.0 - 51.0 %     [facility]

 

   



                 MCV             91.7            82.0 - 100.0 fL     [facility]

 

   



                 MCH             31.0            27.0 - 34.0 pg     [facility]

 

   



                 MCHC            33.8            31.0 - 37.0 g/dL     [facility]

 

   



                 RDW - SD        43.3            37.0 - 55.0 fL     [facility]

 

   



                 MPV             9.7             8.8 - 13.2 fL     [facility]

 

   



                 Platelet count     242             150 - 400 k/uL     [facility]

 

   



                 Nucleated RBC     0.00            /100 WBC        [facility]

 

   



                 Neutrophils     54.7            39.0 - 69.0 %     [facility]

 

   



                 Lymphocytes     31.8            25.0 - 45.0 %     [facility]

 

   



                 Monocytes       7.3             0.0 - 10.0 %     [facility]

 

   



                 Eosinophils     4.8             0.0 - 5.0 %     [facility]

 

   



                 Basophils       1.2 (H)         0.0 - 1.0 %     [facility]

 

   



                 Immature granulocytes     0.2Comment: "Immature     0.0 - 1.0 %     [facility]





                           granulocytes"  (promyelocytes,      HOSPITAL



                                         myelocytes, metamyelocytes)  













                                         Specimen

 





                                         Blood









   



                 Performing Organization     Address         City/State/Zipcode     Phone Number

 

   



                     [facility] DEPARTMENT OF     [address] [place] [address] 



                                         PATHOLOGY AND GENOMIC   



                                         MEDICINE   

 

   



                     [address]   





* Basic metabolic panel (2018  4:00 AM CST)



Only the most recent of 2 results within the time period is included.





   



                 Component       Value           Ref Range       Performed At

 

   



                 Sodium          139             135 - 148 mEq/L     [facility]

 

   



                 Potassium       3.6             3.5 - 5.0 mEq/L     [facility]

 

   



                 Chloride        101             98 - 112 mEq/L     [facility]

 

   



                 CO2             24              24 - 31 mEq/L     [facility]

 

   



                 Anion gap       14@ANIO         7 - 15 mEq/L     [facility]

 

   



                 BUN             11              6 - 20 mg/dL     [facility]

 

   



                 Creatinine      0.89            0.70 - 1.20 mg/dL     [facility]

 

   



                 Glucose         100 (H)         65 - 99 mg/dL     [facility]

 

   



                 Calcium         8.7             8.3 - 10.2 mg/dL     [facility]













                                         Specimen

 





                                         Plasma specimen









   



                 Performing Organization     Address         City/[facility]/Zipcode     Phone Number

 

   



                     [facility] DEPARTMENT      [address] [place] [address] 



                                         PATHOLOGY AND GENOMIC   



                                         MEDICINE   

 

   



                     [facility]     [address] [place] [id number] 



                                         HOSPITAL   





* XR Chest 1 Vw Portable (2018  9:21 PM CST)





 



                           Narrative                 Performed At

 

 



                           EXAMINATION:XR CHEST 1 VW PORTABLE      [facility]



                                         CLINICAL HISTORY:Shortness of breath 



                                         COMPARISON:2 



                                         IMPRESSION: 



                                         Cardiac silhouette enlarged. Pulmonary vasculature upper normal. Shallow 



                                         inspiratory depth. No lobar consolidation or pleural effusion identified on the

 



                                         frontal view(s). 



                                         [id number] 









                                        Procedure Note

 

                                        



 Interface, Radiology Results Incoming - 2018 10:11 PM CST



EXAMINATION:  XR CHEST 1 VW PORTABLE



CLINICAL HISTORY:  Shortness of breath



COMPARISON:  2 



IMPRESSION:

Cardiac silhouette enlarged. Pulmonary vasculature upper normal. Shallow 
inspiratory depth. No lobar consolidation or pleural effusion identified on the 
frontal view(s). 



[facility]-[id number]











   



                 Performing Organization     Address         [facility]     Phone Number

 

   



                     [facility]          [address] [place] [address] 





* ECG 12 lead (2018  8:47 PM CST)





   



                 Component       Value           Ref Range       Performed At

 

   



                     Ventricular rate     88                  [facility]

 

   



                     Atrial rate         88                  [facility]

 

   



                     KS interval         178                 [facility]

 

   



                     QRSD interval       98                  [facility]

 

   



                     QT interval         374                 [facility]

 

   



                     QTC interval        452                 [facility]

 

   



                     P axis 1            54                  [facility]

 

   



                     QRS axis 1          1                   [facility]

 

   



                     T wave axis         26                  [facility]

 

   



                     EKG impression      Normal sinus rhythm-Moderate      [facility]



                                         voltage criteria for LVH, may  



                                         be normal variant-Nonspecific  



                                         T wave abnormality-Abnormal  



                                         ECG-In automated comparison  



                                         with ECG of 2013  



                                         22:02,-No significant change  



                                         was found-Electronically  



                                         Signed By Conner Ward

  



                                         ([id number]) on 2018 8:05:01  



                                         AM  









 



                           Narrative                 Performed At

 

 



                                         This result has an attachment that is not available. 









   



                 Performing Organization     Address         City/[facility]/Zipcode     Phone Number

 

   



                     [facility]            [address] [place] [address] 





* Troponin (2018  6:24 PM CST)





   



                 Component       Value           Ref Range       Performed At

 

   



                 Troponin        <0.30           0.00 - 0.30 ng/mL     [facility]



                           Comment:                  HOSPITAL



                                         0.30 - 1.49  



                                         ng/mlMay  



                                         indicate increased risk of  



                                         acute  



                                           



                                          coronary  



                                         syndrome.  



                                           



                                           



                                         >=1.5  



                                         ng/ml  



                                         Consistent with acute  



                                         myocardial  



                                           



                                          infarction.  



                                           



                                           



                                           



                                           



                                           



                                         The diagnostic value of a  



                                         single normal or  



                                         non-diagnostic  



                                         result is  



                                         questionable.Serial  



                                         samples at 2-6 hour intervals  



                                         are required to rule out acute  



                                         myocardial injury.  













                                         Specimen

 





                                         Plasma specimen









   



                 Performing Organization     Address         City/[facility]/Zipcode     Phone Number

 

   



                     [facility]     [address] [place] [address] 



                                         PATHOLOGY AND GENOMIC   



                                         MEDICINE   

 

   



                     [facility]     [address]   





* Lactic acid level, SEPSIS - Now and repeat 2x every 3 hours (2018  5:32 
  PM CST)



Only the most recent of 3 results within the time period is included.





   



                 Component       Value           Ref Range       Performed At

 

   



                 Lactic acid     2.8 (H)         0.5 - 2.2 mmol/L     [facility]













                                         Specimen

 





                                         Blood









   



                 Performing Organization     Address         [facility]     Phone Number

 

   



                     [facility]     [address] [place] [address] 



                           PATHOLOGY AND GENOMIC     [address] [place] [address]     [address] [facility] [address]     [place] [address] 



                                         EMERGENCY CARE CENTER   





* CRITICAL CARE (2018  2:08 PM CST)





 



                           Narrative                 Performed At

 

 



                                         Silvino Sanchez DO 20181:34 AM 



                                         Critical Care 



                                         Performed by: Silvino Sanchez DO 



                                         Authorized by: Silvino Sanchez DO 



                                         Critical care provider statement: 



                                         Critical care time (minutes):45 



                                         Critical care time was exclusive of:Separately billable procedures and 





                                         treating other patients 



                                         Critical care was necessary to treat or prevent imminent or 



                                         life-threatening deterioration of the following conditions:Circulatory 



                                         failure 



                                         Critical care was time spent personally by me on the following 



                                         activities:Blood draw for specimens, development of treatment plan with 



                                         patient or surrogate, discussions with consultants, evaluation of 



                                         patient's response to treatment, examination of patient, obtaining history 



                                         from patient or surrogate, review of old charts, re-evaluation of 



                                         patient's condition, pulse oximetry, ordering and review of radiographic 



                                         studies, ordering and review of laboratory studies and ordering and 



                                         performing treatments and interventions 



                                         Andrea 'yes' if you are taking over critical care for this patient from 



                                         another provider.: no 



                                         Comments: 



                                          The patient is critically ill due to but not limited to: ongoing 



                                         respiratory failure, high risk for respiratory failure, hemodynamic or 



                                         metabolic deterioration, altered mental status, and acute organ failure. 



                                         The patient is requiring frequent assessment, treatment, life-saving 



                                         devices, and prevention and management of life threatening conditions. 





* Urinalysis (2018  2:06 PM CST)





   



                 Component       Value           Ref Range       Performed At

 

   



                 Glucose, UA     Negative        Negative        [facility]

 

   



                 Bilirubin, UA     Negative        Negative        [facility]

 

   



                 Ketones, UA     Negative        Negative        [facility]

 

   



                 Specific gravity, UA     1.015           1.001 - 1.035     [facility]

 

   



                 Blood, UA       Negative        Negative        [facility]

 

   



                 pH, UA          6.0             5.0 - 8.5       [facility]

 

   



                 Protein, UA     Negative        Negative        [facility]

 

   



                 Urobilinogen, UA     <2.0            <2.0            [facility]

 

   



                 Nitrite, UA     Negative        Negative        [facility]

 

   



                 Leukocyte esterase, UA     Negative        Negative        [facility]

 

   



                     Color, UA           Yellow              [facility]

 

   



                     Appearance, UA      Clear               [facility]













                                         Specimen

 





                                         Urine









   



                 Performing Organization     Address         [facility]     Phone Number

 

   



                     [place] [id number] [facility], [place] [address] 



                           PATHOLOGY AND GENOMIC     [facility]  



                                         MEDICINE[address]     [address] CARE CENTER   





* Gram stain (2018  2:06 PM CST)





   



                 Component       Value           Ref Range       Performed At

 

   



                     Gram stain result     No WBC's or organisms seen.      [facility]



                           Comment:                  HOSPITAL



                                         Specimen Information  



                                         Specimen Source: Urine  



                                         Specimen Site: Urine, clean  



                                         catch  













                                         Specimen

 





                                         Urine - Urine, clean



                                         catch









   



                 Performing Organization     Address         City/State/Zipcode     Phone Number

 

   



                     [facility]     [address] [place] [address] 



                                         PATHOLOGY AND GENOMIC   



                                         MEDICINE   

 

   



                     [place] [id number] 



                                         [facility]   





* Urine culture (2018  2:06 PM CST)





   



                 Component       Value           Ref Range       Performed At

 

   



                     Urine culture isolate     Mixed ken <=10-3 col/cc      [facility]



                           Comment:                  HOSPITAL



                                         Specimen Information  



                                         Specimen Source: Urine  



                                         Specimen Site: Urine, clean  



                                         catch  













                                         Specimen

 





                                         Urine - Urine, clean



                                         catch









   



                 Performing Organization     Address         City/State/Zipcode     Phone Number

 

   



                     [facility] DEPARTMENT OF     [address] [place] [address] 



                                         PATHOLOGY AND GENOMIC   



                                         MEDICINE   

 

   



                     [facility]     [address] [place] [address] 



                                         HOSPITAL   





* CT Abdomen Pelvis W Contrast (2018 11:21 AM CST)



Only the most recent of 2 results within the time period is included.





 



                           Narrative                 Performed At

 

 



                           EXAMINATION:CT ABDOMEN PELVIS W CONTRAST      [facility]



                                         CLINICAL HISTORY:left sided abd pain 



                                         TECHNIQUE: Multiple axial images of the abdomen and pelvis were obtained 



                                         following intravenous administration of iodinated contrast. CT imaging was 



                                         performed with iterative reconstruction technique and/or automated exposure 



                                         control to reduce radiation 



                                         dose. 



                                         COMPARISON: CT abdomen and pelvis dated 2018 



                                         FINDINGS: 



                                         ABDOMEN: 



                                         No free air is seen in the abdomen. 



                                         1. Liver: The liver is normal. No focal mass. 



                                         2. Gallbladder: The gallbladder is normal. 



                                         3. Spleen: The spleen is not enlarged. 



                                         4. Pancreas: The pancreas is unremarkable. 



                                         5. Adrenal Glands: The adrenal glands are unremarkable. 



                                         6. Kidneys: There is a 1.7 cm simple peripelvic cysts in the left kidney which 





                                         is unchanged. There is a subcortical 5 mm hypodensity in the right kidney which

 



                                         is too small to characterize but unchanged and statistically likely representing

 



                                         a cyst. There 



                                         are some scarring involving the right kidney which is unchanged. No visible 



                                         mass. No renal stone, hydronephrosis, or evidence of hydroureter. 



                                         7. Abdominal Aorta: The abdominal aorta is nonaneurysmal. 



                                         8. Nodes: No enlarged retroperitoneal or mesenteric lymphadenopathy. 



                                         9. Bowel: Small to moderate amount of stool in the colon. There are a few 



                                         scattered colonic diverticula without findings to suggest acute diverticulitis.

 



                                         The appendix is normal. There is no evidence of a large or small bowel 



                                         obstruction. Stomach is 



                                         grossly unremarkable. 



                                         10. Ascites: No ascites or fluid collections. 



                                         PELVIS: 



                                         1.Pelvis: No mass, fluid collection or significant adenopathy. Bilateral 



                                         fat-containing inguinal hernias. 



                                         2. Bones: Degenerative changes of the osseous structures. No suspicious lesions.

 



                                         3. Lung Bases: Unremarkable 



                                         IMPRESSION: 



                                         No CT evidence of an acute abnormality in the abdomen or pelvis. 



                                         [facility]-[id number] 









                                        Procedure Note

 

                                        



 Interface, Radiology Results Incoming - 2018 11:34 AM CST



EXAMINATION:  CT ABDOMEN PELVIS W CONTRAST



CLINICAL HISTORY:  left sided abd pain



TECHNIQUE: Multiple axial images of the abdomen and pelvis were obtained 
following intravenous administration of iodinated contrast. CT imaging was 
performed with iterative reconstruction technique and/or automated exposure 
control to reduce radiation 

dose.



COMPARISON: CT abdomen and pelvis dated 2018



FINDINGS:



ABDOMEN:



No free air is seen in the abdomen.



                                        1. Liver: The liver is normal. No focal mass.



                                        2. Gallbladder: The gallbladder is normal.



                                        3. Spleen: The spleen is not enlarged.



                                        4. Pancreas: The pancreas is unremarkable.



                                        5. Adrenal Glands: The adrenal glands are unremarkable.



                                        6. Kidneys: There is a 1.7 cm simple peripelvic cysts in the left kidney which is

 unchanged. There is a subcortical 5 mm hypodensity in the right kidney which is
too small to characterize but unchanged and statistically likely representing a 
cyst. There 

are some scarring involving the right kidney which is unchanged. No visible 
mass. No renal stone, hydronephrosis, or evidence of hydroureter.



                                        7. Abdominal Aorta: The abdominal aorta is nonaneurysmal.



                                        8. Nodes: No enlarged retroperitoneal or mesenteric lymphadenopathy.



                                        9. Bowel: Small to moderate amount of stool in the colon. There are a few scattered

 colonic diverticula without findings to suggest acute diverticulitis. The 
appendix is normal. There is no evidence of a large or small bowel obstruction. 
Stomach is 

grossly unremarkable.



                                        10. Ascites: No ascites or fluid collections.



PELVIS:



                                        1.  Pelvis: No mass, fluid collection or significant adenopathy. Bilateral fat-containing

 inguinal hernias.



                                        2. Bones: Degenerative changes of the osseous structures. No suspicious lesions.





                                        3. Lung Bases: Unremarkable    



IMPRESSION:



No CT evidence of an acute abnormality in the abdomen or pelvis.



[facility]-[id number]











   



                 Performing Organization     Address         City/State/Zipcode     Phone Number

 

   



                     [facility]          [address] [place] [address] 





* Lipase level (2018 10:07 AM CST)



Only the most recent of 2 results within the time period is included.





   



                 Component       Value           Ref Range       Performed At

 

   



                 Lipase          46              13 - 60 U/L     [facility]













                                         Specimen

 





                                         Serum









   



                 Performing Organization     Address         City/State/Zipcode     Phone Number

 

   



                      DEPARTMENT      [address] [place] [address] 



                           PATHOLOGY AND GENOMIC     [address]  



                                         MEDICINE, [facility]     [address]     [place] [address] 



                                         [facility]   





* Amylase level (2018 10:07 AM CST)



Only the most recent of 2 results within the time period is included.





   



                 Component       Value           Ref Range       Performed At

 

   



                 Amylase         66              28 - 100 U/L     [facility]













                                         Specimen

 





                                         Serum









   



                 Performing Organization     Address         City[facility]     Phone Number

 

   



                     [place] [address] 



                           PATHOLOGY AND GENOMIC     [address] [place], [place] [address] [facility] [address]     [place] [address] 



                                         EMERGENCY [facility]ARE CENTER   





* Comprehensive metabolic panel (2018 10:07 AM CST)





   



                 Component       Value           Ref Range       Performed At

 

   



                 Sodium          139             135 - 148 mEq/L     [facility]

 

   



                 Potassium       3.9             3.5 - 5.0 mEq/L     [facility]

 

   



                 Chloride        107             98 - 112 mEq/L     [facility]

 

   



                 CO2             23 (L)          24 - 31 mEq/L     [facility]

 

   



                 Anion gap       9@ANIO          7 - 15 mEq/L     [facility]

 

   



                 BUN             12              6 - 20 mg/dL     [facility]

 

   



                 Creatinine      0.93            0.70 - 1.20 mg/dL     [facility]

 

   



                 Glucose         112 (H)         65 - 99 mg/dL     [facility]

 

   



                 Calcium         9.3             8.3 - 10.2 mg/dL     [facility]

 

   



                 Protein         7.2             6.3 - 8.3 g/dL     [facility]

 

   



                 Albumin         4.3             3.5 - 5.0 g/dL     [facility]

 

   



                 A/G ratio       1.5             0.7 - 3.8       [facility]

 

   



                 Alkaline phosphatase     74              40 - 129 U/L     [facility]

 

   



                 AST             25              10 - 50 U/L     [facility]

 

   



                 ALT             27              5 - 50 U/L      [facility]

 

   



                 Total bilirubin     0.3             0.0 - 1.2 mg/dL     [facility]













                                         Specimen

 





                                         Plasma specimen









   



                 Performing Organization     Address         City[facility]     Phone Number

 

   



                     [address] [address] 



                           PATHOLOGY AND GENOMIC     [address]  



                                         [place] [id number] [facility] [address]     [place] [address] 



                                         EMERGENCY CARE CENTER   





* Estimated GFR (2018 10:43 AM CDT)





   



                 Component       Value           Ref Range       Performed At

 

   



                 GFR Non Af Amer     80              mL/min/1.73 m2      DEPARTMENT OF



                                         PATHOLOGY AND



                                         GENOMIC MEDICINE,



                                         [facility]

 

   



                 GFR Af Amer     >90             mL/min/1.73 m2      DEPARTMENT OF



                           Comment:                  PATHOLOGY AND



                           Chronic kidney disease: <60      GENOMIC MEDICINE,



                           mL/min/1.73m2             [facility]



                           Kidney failure: <15       [facility]



                                         mL/min/1.73m2  



                                         The estimated GFR is  



                                         calculated from the  



                                         IDMS-traceable Modification of  



                                         Diet  



                                         in Renal Disease Equation. The  



                                         accuracy of the calculation is  



                                         poor when the  



                                         creatinine is normal.  



                                         Calculated values >90  



                                         mL/min/1.73m2 are not  



                                         reported.  



                                         This equation has not been  



                                         validated in children (<18  



                                         years), pregnant  



                                         women, the elderly (>70  



                                         years), or ethnic groups other  



                                         than Caucasians and  



                                          Americans.  













                                         Specimen

 





                                         Plasma specimen









   



                 Performing Organization     Address         [facility]     Phone Number

 

   



                     [address] [address] 



                                         PATHOLOGY AND GENOMIC   



                                         [facility]   





* Hepatic function panel (2018 10:43 AM CDT)





   



                 Component       Value           Ref Range       Performed At

 

   



                 Albumin         3.4             3.3 - 5.5 g/dL      [facility]



                                         [facility]

 

   



                 Alkaline phosphatase     53              53 - 128 U/L      [facility]



                                         [facility]



                                         [facility]

 

   



                 ALT             56 (H)          10 - 47 U/L      [facility]PART[facility] OF



                                         [facility]



                                         [facility]

 

   



                 AST             48 (H)          11 - 38 U/L      [facility]



                                         [facility]



                                         [facility]

 

   



                 Bilirubin direct     0.2             0.0 - 0.3 mg/dL      DEPARTMENT OF



                                         [facility]



                                         [facility]

 

   



                 Total bilirubin     0.7             0.2 - 1.6 mg/dL      DEPART[facility] OF



                                         [facility]Y [facility]



                                         [facility]

 

   



                 Protein         7.7             6.4 - 8.1 g/dL      DEPARTMENT OF



                                         [facility]Y [facility]



                                         [facility]













                                         Specimen

 





                                         Plasma specimen









   



                 Performing Organization     Address         City/State/Zipcode     Phone Number

 

   



                     [address] [address] 



                                         PATHOLOGY AND GENOMIC   



                                         [facility]   





after 2018



Advance Directives





Patient has advance care planning documents on file. For more information, bianka evangelista contact:



Ramesh Neil



[address] [place] [phone]

## 2019-01-05 NOTE — XMS REPORT
Clinical Summary

                             Created on: 2019



Casey Garciaaj Sudhir

External Reference #: [id number]

: 1970

Sex: Male



Demographics







                          Address                   [address] [place]  [address]

 

                          Home Phone                [phone]

 

                          Preferred Language        English

 

                          Marital Status            

 

                          Druze Affiliation     Unknown

 

                          Race                      White

 

                          Ethnic Group              Non-





Author







                          Author                    [facility]

 

                          Organization              [facility]

 

                          Address                   Unknown

 

                          Phone                     Unavailable







Support







                Name            Relationship    Address         Phone

 

                Marry Garcia    [employer]            Unknown         [phone]







Care Team Providers







                    Care Team Member Name    Role                Phone

 

                    Gilford, Timberly MD    PCP                 [phone]







Allergies







                                        Comments



                 Active Allergy     Reactions       Severity        Noted Date 

 

                                        



"severe depression"



                 Rofecoxib       Other (See      High            2018 



                                         Comments)   







Medications







                          End Date                  Status



              Medication     Sig          Dispensed     Refills      Start  



                                         Date  

 

                          2019                Active



              lisinopril     Take 2       60 tablet     0              



                     (PRINIVIL,ZESTRIL) 20 mg     tablets (40         8  



                           tablet                    mg total) by     



                                         mouth daily     



                                         for 30 days.     

 

                          2019                Active



              thiamine (vitamin B-1)     Take 1 tablet     30 tablet     0              



                     100 MG tablet       (100 mg             8  



                                         total) by     



                                         mouth daily     



                                         for 30 days.     

 

                          2018                Discontinued



                 clonIDINE (CATAPRES) 0.1     TAKE 1 TABLET      2                 



                     MG tablet           BY MOUTH            8  



                                         EVERY DAY AS     



                                         NEEDED FOR     



                                         BLOOD     



                                         PRESSURE     



                                         >170/110     

 

                          2018                Discontinued



                     dextroamphetamine-ampheta     Adderall            0   



                                         mine (ADDERALL) 20 mg      



                                         tablet      

 

                          2018                Discontinued



                     lisinopril          Take 20 mg by       0   



                           (PRINIVIL,ZESTRIL) 20 mg     mouth daily.     



                                         tablet      

 

                          10/09/2018                



              sucralfate (CARAFATE) 100     Take 10 mL (1     1200 mL      0              



                     mg/mL suspension     g total) by         8  



                                         mouth 4     



                                         (four) times     



                                         a day for 30     



                                         days.     

 

                          10/09/2018                



              lansoprazole (PREVACID)     Take 1       30 capsule     0              



                     30 MG capsule       capsule (30         8  



                                         mg total) by     



                                         mouth daily     



                                         for 30 days.     

 

                          2018                



              famotidine (PEPCID) 20 MG     Take 1 tablet     30 tablet     0              



                     tablet              (20 mg total)       8  



                                         by mouth 2     



                                         (two) times a     



                                         day for 15     



                                         days.     

 

                          2018                



              traMADol 100 mg     Take 100 mg     4 capsule     0              



                     capsule,ER biphase 24 hr     by mouth            8  



                           25-75                     daily for 4     



                                         days.     

 

                          2018                



              sucralfate (CARAFATE) 100     Take 10 mL (1     200 mL       0              



                     mg/mL suspension     g total) by         8  



                                         mouth 4     



                                         (four) times     



                                         a day before     



                                         meals and     



                                         nightly for 5     



                                         days.     

 

                          2018                



              acetaminophen-codeine     Take 1 tablet     14 tablet     0              



                     (TYLENOL WITH CODEINE #3)     by mouth            8  



                           300-30 mg per tablet      every 6 (six)     



                                         hours as     



                                         needed for     



                                         moderate pain     



                                         for up to 5     



                                         days.     

 

                          2018                



              metroNIDAZOLE (FLAGYL)     Take 1 tablet     30 tablet     0              



                     500 MG tablet       (500 mg             8  



                                         total) by     



                                         mouth 3     



                                         (three) times     



                                         a day for 10     



                                         days.     







Active Problems







 



                           Problem                   Noted Date

 

 



                           Clostridium difficile infection     2018







Encounters







                          Care Team                 Description



                     Date                Type                Specialty  

 

                                        



[facility]        



                     [facility]          Orders Only         Internal Medicine  

 

                                        



Rehrer, DO Dilia Rodriguez Martina C., MD                 Intractable abdominal pain (Primary Dx); 

Essential hypertension



                     2018          Emergency           General Internal Medicine  



                                         -    



                                         2018    

 

                                        



Clark Abarca MD                Abdominal pain, unspecified abdominal location (Primary

 Dx)



                     2018          Emergency           Emergency Medicine  



after 2018



Social History







                                        Date



                 Tobacco Use     Types           Packs/Day       Years Used 

 

                                        Quit: 2018



                           Former Smoker             1 

 

    



                                         Smokeless Tobacco: Former   



                                         User   









   



                 Alcohol Use     Drinks/Week     oz/Week         Comments

 

   



                           Yes                       - last drink yesterday yong









 



                           Sex Assigned at Birth     Date Recorded

 

 



                                         Not on file 









                                        Industry



                           Job Start Date            Occupation 

 

                                        Not on file



                           Not on file               Not on file 









                                        Travel End



                           Travel History            Travel Start 

 





                                         No recent travel history available.







Last Filed Vital Signs







                                        Time Taken



                           Vital Sign                Reading 

 

                                        2018  2:18 PM CST



                           Blood Pressure            163/98 

 

                                        2018 12:33 PM CST



                           Pulse                     89 

 

                                        2018 12:33 PM CST



                           Temperature               35.8 C (96.5 F) 

 

                                        2018 12:33 PM CST



                           Respiratory Rate          18 

 

                                        2018 12:33 PM CST



                           Oxygen Saturation         95% 

 

                                        -



                           Inhaled Oxygen            - 



                                         Concentration  

 

                                        2018 10:13 AM CST



                           Weight                    104 kg (230 lb) 

 

                                        2018 10:13 AM CST



                           Height                    188 cm (6' 2") 

 

                                        2018 10:13 AM CST



                           Body Mass Index           29.53 







Plan of Treatment







   



                 Health Maintenance     Due Date        Last Done       Comments

 

   



                           INFLUENZA VACCINE         2018  







Procedures







                                        Comments



                 Procedure Name     Priority        Date/Time       Associated Diagnosis 

 

                                        



Results for this procedure are in the results section.



                     GASTROINTESTINAL PANEL     Routine             2018  



                                         11:00 AM CST  

 

                                        



Results for this procedure are in the results section.



                     LACTIC ACID LEVEL     Routine             2018  



                                         8:05 AM CST  

 

                                        



Results for this procedure are in the results section.



                     ESTIMATED GFR       Routine             2018  



                                         4:00 AM CST  

 

                                        



Results for this procedure are in the results section.



                     BASIC METABOLIC PANEL     Routine             2018  



                                         4:00 AM CST  

 

                                        



Results for this procedure are in the results section.



                     HC COMPLETE BLD COUNT     Routine             2018  



                           W/AUTO DIFF               4:00 AM CST  

 

                                        



Results for this procedure are in the results section.



                     LACTIC ACID LEVEL     Routine             2018  



                                         12:30 AM CST  

 

                                        



Results for this procedure are in the results section.



                     XR CHEST 1 VW PORTABLE     STAT                2018  



                                         9:21 PM CST  

 

                                        



Results for this procedure are in the results section.



                     ECG 12-LEAD         STAT                2018  



                                         8:47 PM CST  

 

                                        



Results for this procedure are in the results section.



                     LACTIC ACID LEVEL     STAT                2018  



                                         6:24 PM CST  

 

                                        



Results for this procedure are in the results section.



                     TROPONIN            STAT                2018  



                                         6:24 PM CST  

 

                                        



Results for this procedure are in the results section.



                     LACTIC ACID LEVEL, SEPSIS     Timed               2018  



                           - NOW AND REPEAT 2X EVERY      5:32 PM CST  



                                         3 HOURS    

 

                                        



Results for this procedure are in the results section.



                     LACTIC ACID LEVEL, SEPSIS     Timed               2018  



                           - NOW AND REPEAT 2X EVERY      2:11 PM CST  



                                         3 HOURS    

 

                                        



Results for this procedure are in the results section.



                     MD CRITICAL CARE, E/M     Routine             2018  



                           30-74 MINUTES             2:08 PM CST  

 

                                        



Results for this procedure are in the results section.



                     URINALYSIS          STAT                2018  



                                         2:06 PM CST  

 

                                        



Results for this procedure are in the results section.



                     GRAM STAIN          Routine             2018  



                                         2:06 PM CST  

 

                                        



Results for this procedure are in the results section.



                     URINE CULTURE       Routine             2018  



                                         2:06 PM CST  

 

                                        



Results for this procedure are in the results section.



                     CT ABDOMEN PELVIS W     STAT                2018  



                           CONTRAST                  11:21 AM CST  

 

                                        



Results for this procedure are in the results section.



                     ESTIMATED GFR       STAT                2018  



                                         10:07 AM CST  

 

                                        



Results for this procedure are in the results section.



                     LACTIC ACID LEVEL, SEPSIS     STAT                2018  



                           - NOW AND REPEAT 2X EVERY      10:07 AM CST  



                                         3 HOURS    

 

                                        



Results for this procedure are in the results section.



                     AMYLASE LEVEL       STAT                2018  



                                         10:07 AM CST  

 

                                        



Results for this procedure are in the results section.



                     LIPASE LEVEL        STAT                2018  



                                         10:07 AM CST  

 

                                        



Results for this procedure are in the results section.



                     COMPREHENSIVE METABOLIC     STAT                2018  



                           PANEL                     10:07 AM CST  

 

                                        



Results for this procedure are in the results section.



                     HC COMPLETE BLD COUNT     STAT                2018  



                           W/AUTO DIFF               10:07 AM CST  

 

                                        



Results for this procedure are in the results section.



                     CT ABDOMEN PELVIS W     STAT                2018  



                           CONTRAST                  11:50 AM CDT  

 

                                        



Results for this procedure are in the results section.



                     HC COMPLETE BLD COUNT     STAT                2018  



                           W/AUTO DIFF               10:46 AM CDT  

 

                                        



Results for this procedure are in the results section.



                     ZZESTIMATED GFR     STAT                2018  



                                         10:43 AM CDT  

 

                                        



Results for this procedure are in the results section.



                     LIPASE LEVEL        STAT                2018  



                                         10:43 AM CDT  

 

                                        



Results for this procedure are in the results section.



                     AMYLASE LEVEL       STAT                2018  



                                         10:43 AM CDT  

 

                                        



Results for this procedure are in the results section.



                     HEPATIC FUNCTION PANEL     STAT                2018  



                                         10:43 AM CDT  

 

                                        



Results for this procedure are in the results section.



                     BASIC METABOLIC PANEL     STAT                2018  



                                         10:43 AM CDT  



after 2018



Results

* Gastrointestinal panel (2018 11:00 AM CST)





   



                 Component       Value           Ref Range       Performed At

 

   



                     Gastrointestinal panel     Positive for C. difficile      [facility]



                                         -----------------------  



                                         Negative for all other  



                                         pathogens tested:  



                                         Negative for Salmonella  



                                         Negative for Campylobacter  



                                         Negative for Diarrheagenic E  



                                         coli/Shigella  



                                         Negative for Shiga-like  



                                         toxin-producing E coli  



                                         Negative for Plesiomonas  



                                         shigelloides  



                                         Negative for Yersinia  



                                         enterocolitica  



                                         Negative for Vibrio species  



                                         Negative for Cryptosporidium  



                                         Negative for Giardia lamblia  



                                         Negative for Cyclospora  



                                         cayeteanensis  



                                         Negative for Entamoeba  



                                         histolytica  



                                         Negative for Adenovirus F  



                                         40/41  



                                         Negative for Astrovirus  



                                         Negative for Norovirus GI/GII  



                                         Negative for Rotavirus A  



                                         Negative for Sapovirus  



                                         Negative for E coli 0157  



                                         This real-time PCR assay  



                                         detects the presence of  



                                         nucleic  



                                         acids (RNA or DNA) for the  



                                         gastrointestinal pathogens  



                                         listed.  



                                         A result of "Not-detected"  



                                         does not exclude the  



                                         possibility  



                                         of the presence of one or more  



                                         pathogens at concentrations  



                                         less than the detectable  



                                         limits of the assay.  



                                         (A)  



                                         Comment:  



                                         Specimen Information  



                                         Specimen Source: Stool  



                                         Specimen Site: Nonpreserved  













                                         Specimen

 





                                         Stool - Nonpreserved









   



                 Performing Organization     Address         City/[facility]     Phone Number

 

   



                     [facility] [place] [address] 



                                         PATHOLOGY [address]   





* Lactic acid level (2018  8:05 AM CST)



Only the most recent of 3 results within the time period is included.





   



                 Component       Value           Ref Range       Performed At

 

   



                 Lactic acid     1.5             0.5 - 2.2 mmol/L     [facility]













                                         Specimen

 





                                         Blood









   



                 Performing Organization     Address         City/State/[facility]     Phone Number

 

   



                     [facility] [place] [address] 



                                         PATHOLOGY [address]   





* Estimated GFR (2018  4:00 AM CST)



Only the most recent of 2 results within the time period is included.





   



                 Component       Value           Ref Range       Performed At

 

   



                 Estimated GFR     >=90            mL/min/1.73 m2     [facility]



                           Comment:                  HOSPITAL



                                         CatergoryUnitsInte  



                                         rpretation  



                                         G1  



                                         >=90 Normal or high  



                                         G2  



                                         60-89Mildly decreased  



                                         [id number]  



                                         Mildly to moderately  



                                         decreased  



                                         [id number]  



                                         Moderately to severely  



                                         decreased  



                                         G4  



                                         15-29Severely  



                                         decreased  



                                         G5  



                                         <15Kidney failure  



                                         The eGFR was calculated using  



                                         the Chronic Kidney Disease  



                                         Epidemiology Collaboration  



                                         (CKD-EPI) equation.  



                                         Interpretation is based on  



                                         recommendations of the  



                                         [employer]  



                                         [employer]  



                                         Outcomes Quality  



                                         Initiative ([id number])  



                                         published in 2014.  













                                         Specimen

 





                                         Plasma specimen









   



                 Performing Organization     Address         City/[facility]     Phone Number

 

   



                     [facility] [place] [address] 



                                         PATHOLOGY AND [facility]   



                                         MEDICINE   

 

   



                     [address]   





* CBC with platelet and differential (2018  4:00 AM CST)



Only the most recent of 3 results within the time period is included.





   



                 Component       Value           Ref Range       Performed At

 

   



                 WBC             6.42            4.50 - 11.00 k/uL     [facility]

 

   



                 RBC             3.84 (L)        4.40 - 6.00 m/uL     [facility]

 

   



                 HGB             11.9 (L)        14.0 - 18.0 g/dL     [facility]

 

   



                 HCT             35.2 (L)        41.0 - 51.0 %     [facility]

 

   



                 MCV             91.7            82.0 - 100.0 fL     [facility]

 

   



                 MCH             31.0            27.0 - 34.0 pg     [facility]

 

   



                 MCHC            33.8            31.0 - 37.0 g/dL     [facility]

 

   



                 RDW - SD        43.3            37.0 - 55.0 fL     [facility]

 

   



                 MPV             9.7             8.8 - 13.2 fL     [facility]

 

   



                 Platelet count     242             150 - 400 k/uL     [facility]

 

   



                 Nucleated RBC     0.00            /100 WBC        [facility]

 

   



                 Neutrophils     54.7            39.0 - 69.0 %     [facility]

 

   



                 Lymphocytes     31.8            25.0 - 45.0 %     [facility]

 

   



                 Monocytes       7.3             0.0 - 10.0 %     [facility]

 

   



                 Eosinophils     4.8             0.0 - 5.0 %     [facility]

 

   



                 Basophils       1.2 (H)         0.0 - 1.0 %     [facility]

 

   



                 Immature granulocytes     0.2Comment: "Immature     0.0 - 1.0 %     [facility]





                           granulocytes"  (promyelocytes,      HOSPITAL



                                         myelocytes, metamyelocytes)  













                                         Specimen

 





                                         Blood









   



                 Performing Organization     Address         City/State/Zipcode     Phone Number

 

   



                     [facility] DEPARTMENT OF     [address] [place] [address] 



                                         PATHOLOGY AND GENOMIC   



                                         MEDICINE   

 

   



                     [address]   





* Basic metabolic panel (2018  4:00 AM CST)



Only the most recent of 2 results within the time period is included.





   



                 Component       Value           Ref Range       Performed At

 

   



                 Sodium          139             135 - 148 mEq/L     [facility]

 

   



                 Potassium       3.6             3.5 - 5.0 mEq/L     [facility]

 

   



                 Chloride        101             98 - 112 mEq/L     [facility]

 

   



                 CO2             24              24 - 31 mEq/L     [facility]

 

   



                 Anion gap       14@ANIO         7 - 15 mEq/L     [facility]

 

   



                 BUN             11              6 - 20 mg/dL     [facility]

 

   



                 Creatinine      0.89            0.70 - 1.20 mg/dL     [facility]

 

   



                 Glucose         100 (H)         65 - 99 mg/dL     [facility]

 

   



                 Calcium         8.7             8.3 - 10.2 mg/dL     [facility]













                                         Specimen

 





                                         Plasma specimen









   



                 Performing Organization     Address         City/[facility]/Zipcode     Phone Number

 

   



                     [facility] DEPARTMENT      [address] [place] [address] 



                                         PATHOLOGY AND GENOMIC   



                                         MEDICINE   

 

   



                     [facility]     [address] [place] [id number] 



                                         HOSPITAL   





* XR Chest 1 Vw Portable (2018  9:21 PM CST)





 



                           Narrative                 Performed At

 

 



                           EXAMINATION:XR CHEST 1 VW PORTABLE      [facility]



                                         CLINICAL HISTORY:Shortness of breath 



                                         COMPARISON:2 



                                         IMPRESSION: 



                                         Cardiac silhouette enlarged. Pulmonary vasculature upper normal. Shallow 



                                         inspiratory depth. No lobar consolidation or pleural effusion identified on the

 



                                         frontal view(s). 



                                         [id number] 









                                        Procedure Note

 

                                        



 Interface, Radiology Results Incoming - 2018 10:11 PM CST



EXAMINATION:  XR CHEST 1 VW PORTABLE



CLINICAL HISTORY:  Shortness of breath



COMPARISON:  2 



IMPRESSION:

Cardiac silhouette enlarged. Pulmonary vasculature upper normal. Shallow 
inspiratory depth. No lobar consolidation or pleural effusion identified on the 
frontal view(s). 



[facility]-[id number]











   



                 Performing Organization     Address         City/[facility]     Phone Number

 

   



                     [facility]          [address] [place] [address] 





* ECG 12 lead (2018  8:47 PM CST)





   



                 Component       Value           Ref Range       Performed At

 

   



                     Ventricular rate     88                  [facility]

 

   



                     Atrial rate         88                  [facility]

 

   



                     MD interval         178                 [facility]

 

   



                     QRSD interval       98                  [facility]

 

   



                     QT interval         374                 [facility]

 

   



                     QTC interval        452                 [facility]

 

   



                     P axis 1            54                  [facility]

 

   



                     QRS axis 1          1                   [facility]

 

   



                     T wave axis         26                  [facility]

 

   



                     EKG impression      Normal sinus rhythm-Moderate      [facility]



                                         voltage criteria for LVH, may  



                                         be normal variant-Nonspecific  



                                         T wave abnormality-Abnormal  



                                         ECG-In automated comparison  



                                         with ECG of 2013  



                                         22:02,-No significant change  



                                         was found-Electronically  



                                         Signed By Conner Ward

  



                                         ([id number]) on 2018 8:05:01  



                                         AM  









 



                           Narrative                 Performed At

 

 



                                         This result has an attachment that is not available. 









   



                 Performing Organization     Address         City/[facility]/Zipcode     Phone Number

 

   



                     [facility]            [address] [place] [address] 





* Troponin (2018  6:24 PM CST)





   



                 Component       Value           Ref Range       Performed At

 

   



                 Troponin        <0.30           0.00 - 0.30 ng/mL     [facility]



                           Comment:                  HOSPITAL



                                         0.30 - 1.49  



                                         ng/mlMay  



                                         indicate increased risk of  



                                         acute  



                                           



                                          coronary  



                                         syndrome.  



                                           



                                           



                                         >=1.5  



                                         ng/ml  



                                         Consistent with acute  



                                         myocardial  



                                           



                                          infarction.  



                                           



                                           



                                           



                                           



                                           



                                         The diagnostic value of a  



                                         single normal or  



                                         non-diagnostic  



                                         result is  



                                         questionable.Serial  



                                         samples at 2-6 hour intervals  



                                         are required to rule out acute  



                                         myocardial injury.  













                                         Specimen

 





                                         Plasma specimen









   



                 Performing Organization     Address         City/[facility]/Zipcode     Phone Number

 

   



                     [facility]     [address] [place] [address] 



                                         PATHOLOGY AND GENOMIC   



                                         MEDICINE   

 

   



                     [facility]     [address]   





* Lactic acid level, SEPSIS - Now and repeat 2x every 3 hours (2018  5:32 
  PM CST)



Only the most recent of 3 results within the time period is included.





   



                 Component       Value           Ref Range       Performed At

 

   



                 Lactic acid     2.8 (H)         0.5 - 2.2 mmol/L     [facility]













                                         Specimen

 





                                         Blood









   



                 Performing Organization     Address         City/[facility]     Phone Number

 

   



                     [facility]     [address] [place] [address] 



                           PATHOLOGY AND GENOMIC     [address] [place] [address]     [address] [facility] [address]     [place] [address] 



                                         EMERGENCY CARE CENTER   





* CRITICAL CARE (2018  2:08 PM CST)





 



                           Narrative                 Performed At

 

 



                                         Silvino Sanchez DO 20181:34 AM 



                                         Critical Care 



                                         Performed by: Silvino Sanchez DO 



                                         Authorized by: Silvino Sanchez DO 



                                         Critical care provider statement: 



                                         Critical care time (minutes):45 



                                         Critical care time was exclusive of:Separately billable procedures and 





                                         treating other patients 



                                         Critical care was necessary to treat or prevent imminent or 



                                         life-threatening deterioration of the following conditions:Circulatory 



                                         failure 



                                         Critical care was time spent personally by me on the following 



                                         activities:Blood draw for specimens, development of treatment plan with 



                                         patient or surrogate, discussions with consultants, evaluation of 



                                         patient's response to treatment, examination of patient, obtaining history 



                                         from patient or surrogate, review of old charts, re-evaluation of 



                                         patient's condition, pulse oximetry, ordering and review of radiographic 



                                         studies, ordering and review of laboratory studies and ordering and 



                                         performing treatments and interventions 



                                         Andrea 'yes' if you are taking over critical care for this patient from 



                                         another provider.: no 



                                         Comments: 



                                          The patient is critically ill due to but not limited to: ongoing 



                                         respiratory failure, high risk for respiratory failure, hemodynamic or 



                                         metabolic deterioration, altered mental status, and acute organ failure. 



                                         The patient is requiring frequent assessment, treatment, life-saving 



                                         devices, and prevention and management of life threatening conditions. 





* Urinalysis (2018  2:06 PM CST)





   



                 Component       Value           Ref Range       Performed At

 

   



                 Glucose, UA     Negative        Negative        [facility]

 

   



                 Bilirubin, UA     Negative        Negative        [facility]

 

   



                 Ketones, UA     Negative        Negative        [facility]

 

   



                 Specific gravity, UA     1.015           1.001 - 1.035     [facility]

 

   



                 Blood, UA       Negative        Negative        [facility]

 

   



                 pH, UA          6.0             5.0 - 8.5       [facility]

 

   



                 Protein, UA     Negative        Negative        [facility]

 

   



                 Urobilinogen, UA     <2.0            <2.0            [facility]

 

   



                 Nitrite, UA     Negative        Negative        [facility]

 

   



                 Leukocyte esterase, UA     Negative        Negative        [facility]

 

   



                     Color, UA           Yellow              [facility]

 

   



                     Appearance, UA      Clear               [facility]













                                         Specimen

 





                                         Urine









   



                 Performing Organization     Address         City/[facility]     Phone Number

 

   



                     [place] [id number] [facility], [place] [address] 



                           PATHOLOGY AND GENOMIC     [facility]  



                                         MEDICINE[address]     [address] CARE CENTER   





* Gram stain (2018  2:06 PM CST)





   



                 Component       Value           Ref Range       Performed At

 

   



                     Gram stain result     No WBC's or organisms seen.      [facility]



                           Comment:                  HOSPITAL



                                         Specimen Information  



                                         Specimen Source: Urine  



                                         Specimen Site: Urine, clean  



                                         catch  













                                         Specimen

 





                                         Urine - Urine, clean



                                         catch









   



                 Performing Organization     Address         City/State/Zipcode     Phone Number

 

   



                     [facility]     [address] [place] [address] 



                                         PATHOLOGY AND GENOMIC   



                                         MEDICINE   

 

   



                     [place] [id number] 



                                         [facility]   





* Urine culture (2018  2:06 PM CST)





   



                 Component       Value           Ref Range       Performed At

 

   



                     Urine culture isolate     Mixed ken <=10-3 col/cc      [facility]



                           Comment:                  HOSPITAL



                                         Specimen Information  



                                         Specimen Source: Urine  



                                         Specimen Site: Urine, clean  



                                         catch  













                                         Specimen

 





                                         Urine - Urine, clean



                                         catch









   



                 Performing Organization     Address         City/State/Zipcode     Phone Number

 

   



                     [facility] DEPARTMENT OF     [address] [place] [address] 



                                         PATHOLOGY AND GENOMIC   



                                         MEDICINE   

 

   



                     [facility]     [address] [place] [address] 



                                         HOSPITAL   





* CT Abdomen Pelvis W Contrast (2018 11:21 AM CST)



Only the most recent of 2 results within the time period is included.





 



                           Narrative                 Performed At

 

 



                           EXAMINATION:CT ABDOMEN PELVIS W CONTRAST      [facility]



                                         CLINICAL HISTORY:left sided abd pain 



                                         TECHNIQUE: Multiple axial images of the abdomen and pelvis were obtained 



                                         following intravenous administration of iodinated contrast. CT imaging was 



                                         performed with iterative reconstruction technique and/or automated exposure 



                                         control to reduce radiation 



                                         dose. 



                                         COMPARISON: CT abdomen and pelvis dated 2018 



                                         FINDINGS: 



                                         ABDOMEN: 



                                         No free air is seen in the abdomen. 



                                         1. Liver: The liver is normal. No focal mass. 



                                         2. Gallbladder: The gallbladder is normal. 



                                         3. Spleen: The spleen is not enlarged. 



                                         4. Pancreas: The pancreas is unremarkable. 



                                         5. Adrenal Glands: The adrenal glands are unremarkable. 



                                         6. Kidneys: There is a 1.7 cm simple peripelvic cysts in the left kidney which 





                                         is unchanged. There is a subcortical 5 mm hypodensity in the right kidney which

 



                                         is too small to characterize but unchanged and statistically likely representing

 



                                         a cyst. There 



                                         are some scarring involving the right kidney which is unchanged. No visible 



                                         mass. No renal stone, hydronephrosis, or evidence of hydroureter. 



                                         7. Abdominal Aorta: The abdominal aorta is nonaneurysmal. 



                                         8. Nodes: No enlarged retroperitoneal or mesenteric lymphadenopathy. 



                                         9. Bowel: Small to moderate amount of stool in the colon. There are a few 



                                         scattered colonic diverticula without findings to suggest acute diverticulitis.

 



                                         The appendix is normal. There is no evidence of a large or small bowel 



                                         obstruction. Stomach is 



                                         grossly unremarkable. 



                                         10. Ascites: No ascites or fluid collections. 



                                         PELVIS: 



                                         1.Pelvis: No mass, fluid collection or significant adenopathy. Bilateral 



                                         fat-containing inguinal hernias. 



                                         2. Bones: Degenerative changes of the osseous structures. No suspicious lesions.

 



                                         3. Lung Bases: Unremarkable 



                                         IMPRESSION: 



                                         No CT evidence of an acute abnormality in the abdomen or pelvis. 



                                         [facility]-[id number] 









                                        Procedure Note

 

                                        



 Interface, Radiology Results Incoming - 2018 11:34 AM CST



EXAMINATION:  CT ABDOMEN PELVIS W CONTRAST



CLINICAL HISTORY:  left sided abd pain



TECHNIQUE: Multiple axial images of the abdomen and pelvis were obtained 
following intravenous administration of iodinated contrast. CT imaging was 
performed with iterative reconstruction technique and/or automated exposure 
control to reduce radiation 

dose.



COMPARISON: CT abdomen and pelvis dated 2018



FINDINGS:



ABDOMEN:



No free air is seen in the abdomen.



                                        1. Liver: The liver is normal. No focal mass.



                                        2. Gallbladder: The gallbladder is normal.



                                        3. Spleen: The spleen is not enlarged.



                                        4. Pancreas: The pancreas is unremarkable.



                                        5. Adrenal Glands: The adrenal glands are unremarkable.



                                        6. Kidneys: There is a 1.7 cm simple peripelvic cysts in the left kidney which is

 unchanged. There is a subcortical 5 mm hypodensity in the right kidney which is
too small to characterize but unchanged and statistically likely representing a 
cyst. There 

are some scarring involving the right kidney which is unchanged. No visible 
mass. No renal stone, hydronephrosis, or evidence of hydroureter.



                                        7. Abdominal Aorta: The abdominal aorta is nonaneurysmal.



                                        8. Nodes: No enlarged retroperitoneal or mesenteric lymphadenopathy.



                                        9. Bowel: Small to moderate amount of stool in the colon. There are a few scattered

 colonic diverticula without findings to suggest acute diverticulitis. The 
appendix is normal. There is no evidence of a large or small bowel obstruction. 
Stomach is 

grossly unremarkable.



                                        10. Ascites: No ascites or fluid collections.



PELVIS:



                                        1.  Pelvis: No mass, fluid collection or significant adenopathy. Bilateral fat-containing

 inguinal hernias.



                                        2. Bones: Degenerative changes of the osseous structures. No suspicious lesions.





                                        3. Lung Bases: Unremarkable    



IMPRESSION:



No CT evidence of an acute abnormality in the abdomen or pelvis.



[facility]-[id number]











   



                 Performing Organization     Address         City/State/Zipcode     Phone Number

 

   



                     [facility]          [address] [place] [address] 





* Lipase level (2018 10:07 AM CST)



Only the most recent of 2 results within the time period is included.





   



                 Component       Value           Ref Range       Performed At

 

   



                 Lipase          46              13 - 60 U/L     [facility]













                                         Specimen

 





                                         Serum









   



                 Performing Organization     Address         City/State/Zipcode     Phone Number

 

   



                      DEPARTMENT      [address] [place] [address] 



                           PATHOLOGY AND GENOMIC     [address]  



                                         MEDICINE, [facility]     [address]     [place] [address] 



                                         [facility]ARE [facility]   





* Amylase level (2018 10:07 AM CST)



Only the most recent of 2 results within the time period is included.





   



                 Component       Value           Ref Range       Performed At

 

   



                 Amylase         66              28 - 100 U/L     [facility]













                                         Specimen

 





                                         Serum









   



                 Performing Organization     Address         City/[facility]     Phone Number

 

   



                     [place] [address] 



                           PATHOLOGY AND GENOMIC     [address] [place], [place] [address] [facility] [address]     [place] [address] 



                                         EMERGENCY CARE CENTER   





* Comprehensive metabolic panel (2018 10:07 AM CST)





   



                 Component       Value           Ref Range       Performed At

 

   



                 Sodium          139             135 - 148 mEq/L     [facility]

 

   



                 Potassium       3.9             3.5 - 5.0 mEq/L     [facility]

 

   



                 Chloride        107             98 - 112 mEq/L     [facility]

 

   



                 CO2             23 (L)          24 - 31 mEq/L     [facility]

 

   



                 Anion gap       9@ANIO          7 - 15 mEq/L     [facility]

 

   



                 BUN             12              6 - 20 mg/dL     [facility]

 

   



                 Creatinine      0.93            0.70 - 1.20 mg/dL     [facility]

 

   



                 Glucose         112 (H)         65 - 99 mg/dL     [facility]

 

   



                 Calcium         9.3             8.3 - 10.2 mg/dL     [facility]

 

   



                 Protein         7.2             6.3 - 8.3 g/dL     [facility]

 

   



                 Albumin         4.3             3.5 - 5.0 g/dL     [facility]

 

   



                 A/G ratio       1.5             0.7 - 3.8       [facility]

 

   



                 Alkaline phosphatase     74              40 - 129 U/L     [facility]

 

   



                 AST             25              10 - 50 U/L     [facility]

 

   



                 ALT             27              5 - 50 U/L      [facility]

 

   



                 Total bilirubin     0.3             0.0 - 1.2 mg/dL     [facility]













                                         Specimen

 





                                         Plasma specimen









   



                 Performing Organization     Address         City/[facility]     Phone Number

 

   



                     [address] [address] 



                           PATHOLOGY AND GENOMIC     [address]  



                                         [place] [address] [facility] [address]     [place] [address] 



                                         EMERGENCY CARE CENTER   





* Estimated GFR (2018 10:43 AM CDT)





   



                 Component       Value           Ref Range       Performed At

 

   



                 GFR Non Af Amer     80              mL/min/1.73 m2      DEPARTMENT OF



                                         PATHOLOGY AND



                                         GENOMIC MEDICINE,



                                         [facility]

 

   



                 GFR Af Amer     >90             mL/min/1.73 m2      DEPARTMENT OF



                           Comment:                  PATHOLOGY AND



                           Chronic kidney disease: <60      GENOMIC MEDICINE,



                           mL/min/1.73m2             [place]



                           Kidney failure: <15       [facility]



                                         mL/min/1.73m2  



                                         The estimated GFR is  



                                         calculated from the  



                                         IDMS-traceable Modification of  



                                         Diet  



                                         in Renal Disease Equation. The  



                                         accuracy of the calculation is  



                                         poor when the  



                                         creatinine is normal.  



                                         Calculated values >90  



                                         mL/min/1.73m2 are not  



                                         reported.  



                                         This equation has not been  



                                         validated in children (<18  



                                         years), pregnant  



                                         women, the elderly (>70  



                                         years), or ethnic groups other  



                                         than Caucasians and  



                                          Americans.  













                                         Specimen

 





                                         Plasma specimen









   



                 Performing Organization     Address         City/[facility]     Phone Number

 

   



                     [address] [address] 



                                         PATHOLOGY AND GENOMIC   



                                         [facility]   





* Hepatic function panel (2018 10:43 AM CDT)





   



                 Component       Value           Ref Range       Performed At

 

   



                 Albumin         3.4             3.3 - 5.5 g/dL      [facility]



                                         [facility]



                                         [facility]

 

   



                 Alkaline phosphatase     53              53 - 128 U/L      DEPARTMENT OF



                                         [facility]Y [facility]



                                         [facility]

 

   



                 ALT             56 (H)          10 - 47 U/L      DEPARTMENT OF



                                         [facility]Y [facility]



                                         [facility]

 

   



                 AST             48 (H)          11 - 38 U/L      DEPARTMENT OF



                                         [facility]Y [facility]



                                         [facility]

 

   



                 Bilirubin direct     0.2             0.0 - 0.3 mg/dL      DEPARTMENT OF



                                         PATHOLOGY [facility]



                                         [facility]

 

   



                 Total bilirubin     0.7             0.2 - 1.6 mg/dL      DEPARTMENT OF



                                         PATHOLOGY [facility]



                                         [facility]

 

   



                 Protein         7.7             6.4 - 8.1 g/dL      DEPARTMENT OF



                                         PATHOLOGY [facility]



                                         [facility]













                                         Specimen

 





                                         Plasma specimen









   



                 Performing Organization     Address         City/State/Zipcode     Phone Number

 

   



                     [address] [address] 



                                         PATHOLOGY AND GENOMIC   



                                         [facility]   





after 2018



Advance Directives





Patient has advance care planning documents on file. For more information, bianka evangelista contact:



Ramesh Neil



[address] [place] [phone]

## 2019-01-05 NOTE — XMS REPORT
Continuity of Care Document

                             Created on: 2018



JC PRESSLEY

External Reference #: [id number]

: 1970

Sex: Male



Demographics







                          Address                   [address] [place] [address]  [id number]

 

                          Home Phone                [phone]

 

                          Preferred Language        Unknown

 

                          Marital Status            Unknown

 

                          Shinto Affiliation     Unknown

 

                          Race                      Unknown

 

                          Ethnic Group              Unknown





Author







                          Author                    [facility]              Interface

 

                          Address                   Unknown

 

                          Phone                     Unavailable



                                                    



Problems

                    





                    Problem                            Status                            Onset Date     

                          Classification                            Date Reported       

                          Comments                            Source                    

 

                    Rib pain on right side                            Active                            

2018                            Problem                            2018  

                                                      [facility]                  

  



                                                                                
       



Medications

                    





                    Medication                            Details                            Route      

                          Status                            Patient Instructions         

                          Ordering Provider                            Order Date           

                                        Source                    

 

                                        Acetaminophen 300 Mg-Codeine 30 Mg Tablet Tylenol-Codeine #3 300 Mg-30 Mg Tablet

                                        Take 1 tablet by mouth every 4 hours as needed for Pain.

                            Oral                            Active                   

                                                                            2018            

                                        [facility]                    



                                                                                
       



Allergies, Adverse Reactions, Alerts

                    





                    Substance                            Category                            Reaction   

                          Severity                            Reaction type           

                          Status                            Date Reported                     

                          Comments                            Source                    



                                                                



Immunizations

                    





                    Immunization                            Date Given                            Site  

                          Status                            Last Updated             

                          Comments                            Source                    



                                                                        



Results

                    





                    Order Name                            Results                            Value      

                          Reference Range                            Date                

                          Interpretation                            Comments                       

                                        Source                    

 

                          XRAY RIBS UNILATERAL 2 VIEWS                            <p>IMPRESSION:</p><p>No displaced

 rib fracture.</p><p> </p><p>Dictated By: Omi Hernandez MD, 2018 5:04
AM</p><p> </p><p>I have reviewed the study and agree with the findings in this 
report.</p><p> </p><p>Signed By: Erin Bennett MD, 2018 5:17 AM</p><p> </p> 
                                        



IMPRESSION:



No displaced rib fracture.



 



Dictated By: Omi Hernandez MD, 2018 5:04 AM



 



I have reviewed the study and agree with the findings in this report.



 



Signed By: Erin Bennett MD, 2018 5:17 AM



                                                           2018                

                                                                            [facility]      

              

 

                          XRAY RIBS UNILATERAL 2 VIEWS                            <p>EXAM: Right rib series:

 4 view(s)</p><p> </p><p>HISTORY: right rib pain</p><p>COMPARISON: Chest x-ray 
on 2018.</p><p> </p><p>DISCUSSION:</p><p> </p><p>No acute displaced 
fracture.</p><p>No pneumothorax. </p><p>Prominent right epicardial fat pad.Right
middle lobe density and lungs&lt;/p><p>better assessed on prior chest two view 
xray.</p><p>The soft tissues are unremarkable.</p><p> </p>                      
                                        



EXAM: Right rib series: 4 view(s)



 



HISTORY: right rib pain



COMPARISON: Chest x-ray on 2018.



 



DISCUSSION:



 



No acute displaced fracture.



No pneumothorax. 



Prominent right epicardial fat pad.Right middle lobe density and lungs



better assessed on prior chest two view xray.



The soft tissues are unremarkable.



                                                           2018                

                                                                            [facility]      

              

 

                          XRAY RIBS UNILATERAL 2 VIEWS                            <p styleCode="header">Interface,

 Rad/Mammog In - 2018  5:22 AM CDT</p><p><span>EXAM: Right rib series: 4 
view(s)</span><br/><br/><span>HISTORY: right rib 
pain</span><br/><span>COMPARISON: Chest x-ray on 
2018.</span><br/><br/><span>DISCUSSION:</span><br/><br/><span>No acute 
displaced fracture.</span><br/><span>No pneumothorax. 
</span><br/><span>Prominent right epicardial fat pad.  Right middle lobe density
and lungs</span><br/><span>better assessed on prior chest two view 
xray.</span><br/><span>The soft tissues are 
unremarkable.</span><br/><br/><span>IMPRESSION</span&gt
;<br/><span>IMPRESSION:</span><br/><span>No displaced rib 
fracture.</span><br/><br/><span>Dictated By: Omi Hernandez MD, 2018 
5:04 AM</span><br/><br/><span>I have reviewed the study and agree with the 
findings in this report.</span><br/><br/><span>Signed By: Erin Bennett MD, 
2018 5:17 AM</span><br/></p>                            



Interface, Rad/Mammog In - 2018  5:22 AM CDT



EXAM: Right rib series: 4 view(s)



HISTORY: right rib pain

COMPARISON: Chest x-ray on 2018.



DISCUSSION:



No acute displaced fracture.

No pneumothorax. 

Prominent right epicardial fat pad.  Right middle lobe density and lungs

better assessed on prior chest two view xray.

The soft tissues are unremarkable.



IMPRESSION

IMPRESSION:

No displaced rib fracture.



Dictated By: Omi Hernandez MD, 2018 5:04 AM



I have reviewed the study and agree with the findings in this report.



Signed By: Erin Bennett MD, 2018 5:17 AM

                                                          2018                 

                                                                            [facility]       

             

 

                                                XRAY RIBS UNILATERAL 2 VIEWS                            





IMPRESSION:



No displaced rib fracture.



 



Dictated By: Omi Hernandez MD, 2018 5:04 AM



 



I have reviewed the study and agree with the findings in this report.



 



Signed By: Erin Bennett MD, 2018 5:17 AM



 



EXAM: Right rib series: 4 view(s)



 



HISTORY: right rib pain



COMPARISON: Chest x-ray on 2018.



 



DISCUSSION:



 



No acute displaced fracture.



No pneumothorax. 



Prominent right epicardial fat pad.Right middle lobe density and lungs



better assessed on prior chest two view xray.



The soft tissues are unremarkable.



 



Interface, Rad/Mammog In - 2018  5:22 AM CDT



EXAM: Right rib series: 4 view(s)



HISTORY: right rib pain

COMPARISON: Chest x-ray on 2018.



DISCUSSION:



No acute displaced fracture.

No pneumothorax. 

Prominent right epicardial fat pad.  Right middle lobe density and lungs

better assessed on prior chest two view xray.

The soft tissues are unremarkable.



IMPRESSION

IMPRESSION:

No displaced rib fracture.



Dictated By: Omi Hernandez MD, 2018 5:04 AM



I have reviewed the study and agree with the findings in this report.



Signed By: Erin Bennett MD, 2018 5:17 AM

                                                          2018                 

                                                                            [facility]       

             

 

                          XRAY CHEST 2 VIEWS                            <p>IMPRESSION: </p><p>Mild irregularity

 of the right seventh rib may be due to mildly</p><p>displaced fracture. 
Recommend further evaluation with right-sided ribs</p><p>series.</p><p> 
</p><p>Right middle lobe airspace opacity obscuring the right heart border 
due</p><p>to atelectasis, contusion, or pneumonia. Recommend follow-up 
chest</p><p>radiograph in 4-6 weeks.</p><p> </p><p> </p><p>Discussed findings 
with Dr. Menezes at 8:00 PM on 2018.</p><p> </p>&lt;p>Dictated By: Omi Hernandez MD, 2018 8:02 PM</p><p> </p><p>I have reviewed the study and
agree with the findings in this report.</p><p> </p>&lt;p>Signed By: Tiffani Veloz MD, 2018 8:03 PM</p><p> </p>                            



IMPRESSION: 



Mild irregularity of the right seventh rib may be due to mildly



displaced fracture. Recommend further evaluation with right-sided ribs



series.



 



Right middle lobe airspace opacity obscuring the right heart border due



to atelectasis, contusion, or pneumonia. Recommend follow-up chest



radiograph in 4-6 weeks.



 



 



Discussed findings with Dr. Menezes at 8:00 PM on 2018.



 



Dictated By: Omi Hernandez MD, 2018 8:02 PM



 



I have reviewed the study and agree with the findings in this report.



 



Signed By: Tiffani Veloz MD, 2018 8:03 PM



                                                           2018                

                                                                            [facility]      

              

 

                          XRAY CHEST 2 VIEWS                            <p>EXAMINATION:XRAY CHEST 2 VIEWS</p><p>

 </p><p>INDICATION: right rib pain</p><p> </p><p>COMPARISON:None</p><p> 
</p><p>FINDINGS:</p><p>TUBES and LINES:None.</p><p> </p><p>LUNGS:Right middle 
lobe airspace opacity obscuring the right heart</p><p>border. Bibasilar 
segmental atelectasis.</p><p> </p><p>PLEURA:No pleural effusion or 
pneumothorax.</p><p> </p><p>HEART AND MEDIASTINUM: Bilateral opacified basilar 
heart borders. The</p><p>cardiac silhouette silhouette is mildly 
enlarged.</p><p> </p><p>BONES AND SOFT TISSUES:Mild irregularity of the lateral 
aspect of the</p><p>right seventh rib.Soft tissues are unremarkable.</p><p> 
</p><p>UPPER ABDOMEN: No free air under the diaphragm.</p><p> </p>              
                                        



EXAMINATION:XRAY CHEST 2 VIEWS



 



INDICATION: right rib pain



 



COMPARISON:None



 



FINDINGS:



TUBES and LINES:None.



 



LUNGS:Right middle lobe airspace opacity obscuring the right heart



border. Bibasilar segmental atelectasis.



 



PLEURA:No pleural effusion or pneumothorax.



 



HEART AND MEDIASTINUM: Bilateral opacified basilar heart borders. The



cardiac silhouette silhouette is mildly enlarged.



 



BONES AND SOFT TISSUES:Mild irregularity of the lateral aspect of the



right seventh rib.Soft tissues are unremarkable.



 



UPPER ABDOMEN: No free air under the diaphragm.



                                                           2018                

                                                                            [facility]      

              

 

                          XRAY CHEST 2 VIEWS                            <p styleCode="header">Interface, Rad/Mammog

 In - 2018  8:09 PM CDT</p><p><span>EXAMINATION:  XRAY CHEST 2 VIEWS    
</span><br/><br/><span>INDICATION: right rib pain  
</span><br/><br/><span>COMPARISON:  None</span><br/><span>     
</span><br/><span>FINDINGS:</span><br/><span>TUBES and LINES:  
None.</span><br/><br/><span>LUNGS:  Right middle lobe airspace opacity obscuring
the right heart</span><br/><span>border. Bibasilar segmental 
atelectasis.</span><br/><br/><span>PLEURA:  No pleural effusion or 
pneumothorax.</span><br/><br/><span>HEART AND MEDIASTINUM: Bilateral opacified 
basilar heart borders. The</span><br/><span>cardiac silhouette silhouette is 
mildly enlarged.</span><br/><br/><span>BONES AND SOFT TISSUES:  Mild irregulari
ty of the lateral aspect of the</span><br/><span>right seventh rib.  Soft 
tissues are unremarkable.</span><br/><br/><span>UPPER ABDOMEN: No free air under
the diaphragm.    </span><br/><br/><span>IMPRESSION</span><br/><span>IMPRESSION:
</span><br/><span>Mild irregularity of the right seventh rib may be due to 
mildly</span><br/><span>displaced fracture. Recommend further evaluation with 
right-sided ribs</span><br/><span>series.</span><br/><br/>&lt;span>Right middle 
lobe airspace opacity obscuring the right heart border due</span><br/><span>to 
atelectasis, contusion, or pneumonia. Recommend follow-up chest</span><br/
><span>radiograph in 4-6 weeks.</span><br/><br/><br/><span>Discussed findings 
with Dr. Menezes at 8:00 PM on 2018.</span><br/><br/><span&gt;Dictated By: 
Omi Hernandez MD, 2018 8:02 PM</span><br/><br/><span>I have reviewed 
the study and agree with the findings in this report.</span><br/><br
/><span>Signed By: Tiffani Veloz MD, 2018 8:03 PM</span><br/></p> 
                                        



Dinesh, Rad/Mammog In - 2018  8:09 PM CDT



EXAMINATION:  XRAY CHEST 2 VIEWS    



INDICATION: right rib pain  



COMPARISON:  None

     

FINDINGS:

TUBES and LINES:  None.



LUNGS:  Right middle lobe airspace opacity obscuring the right heart

border. Bibasilar segmental atelectasis.



PLEURA:  No pleural effusion or pneumothorax.



HEART AND MEDIASTINUM: Bilateral opacified basilar heart borders. The

cardiac silhouette silhouette is mildly enlarged.



BONES AND SOFT TISSUES:  Mild irregularity of the lateral aspect of the

right seventh rib.  Soft tissues are unremarkable.



UPPER ABDOMEN: No free air under the diaphragm.    



IMPRESSION

IMPRESSION: 

Mild irregularity of the right seventh rib may be due to mildly

displaced fracture. Recommend further evaluation with right-sided ribs

series.



Right middle lobe airspace opacity obscuring the right heart border due

to atelectasis, contusion, or pneumonia. Recommend follow-up chest

radiograph in 4-6 weeks.





Discussed findings with Dr. Menezes at 8:00 PM on 2018.



Dictated By: Omi eHrnandez MD, 2018 8:02 PM



I have reviewed the study and agree with the findings in this report.



Signed By: Tiffani Veloz MD, 2018 8:03 PM

                                                          2018                 

                                                                            [facility]       

             

 

                                                XRAY CHEST 2 VIEWS                            



IMPRESSION: 



Mild irregularity of the right seventh rib may be due to mildly



displaced fracture. Recommend further evaluation with right-sided ribs



series.



 



Right middle lobe airspace opacity obscuring the right heart border due



to atelectasis, contusion, or pneumonia. Recommend follow-up chest



radiograph in 4-6 weeks.



 



 



Discussed findings with Dr. Menezes at 8:00 PM on 2018.



 



Dictated By: Omi Hernandez MD, 2018 8:02 PM



 



I have reviewed the study and agree with the findings in this report.



 



Signed By: Tiffani Veloz MD, 2018 8:03 PM



 



EXAMINATION:XRAY CHEST 2 VIEWS



 



INDICATION: right rib pain



 



COMPARISON:None



 



FINDINGS:



TUBES and LINES:None.



 



LUNGS:Right middle lobe airspace opacity obscuring the right heart



border. Bibasilar segmental atelectasis.



 



PLEURA:No pleural effusion or pneumothorax.



 



HEART AND MEDIASTINUM: Bilateral opacified basilar heart borders. The



cardiac silhouette silhouette is mildly enlarged.



 



BONES AND SOFT TISSUES:Mild irregularity of the lateral aspect of the



right seventh rib.Soft tissues are unremarkable.



 



UPPER ABDOMEN: No free air under the diaphragm.



 



Interface, Rad/Mammog In - 2018  8:09 PM CDT



EXAMINATION:  XRAY CHEST 2 VIEWS    



INDICATION: right rib pain  



COMPARISON:  None

     

FINDINGS:

TUBES and LINES:  None.



LUNGS:  Right middle lobe airspace opacity obscuring the right heart

border. Bibasilar segmental atelectasis.



PLEURA:  No pleural effusion or pneumothorax.



HEART AND MEDIASTINUM: Bilateral opacified basilar heart borders. The

cardiac silhouette silhouette is mildly enlarged.



BONES AND SOFT TISSUES:  Mild irregularity of the lateral aspect of the

right seventh rib.  Soft tissues are unremarkable.



UPPER ABDOMEN: No free air under the diaphragm.    



IMPRESSION

IMPRESSION: 

Mild irregularity of the right seventh rib may be due to mildly

displaced fracture. Recommend further evaluation with right-sided ribs

series.



Right middle lobe airspace opacity obscuring the right heart border due

to atelectasis, contusion, or pneumonia. Recommend follow-up chest

radiograph in 4-6 weeks.





Discussed findings with Dr. Menezes at 8:00 PM on 2018.



Dictated By: Omi Hernandez MD, 2018 8:02 PM



I have reviewed the study and agree with the findings in this report.



Signed By: Tiffani Veloz MD, 2018 8:03 PM

                                                          2018                 

                                                                            [facility]       

             



                                                                                
                                                                                
                                              



Vital Signs

                    





                    Vital Sign                            Value                            Date         

                          Comments                            Source                    

 

                    Systolic (mm Hg)                            168                             2018

                                                        [facility]                

    

 

                    Diastolic (mm Hg)                            98                             2018

                                                        [facility]                

    

 

                    Heart Rate                            100                             2018    

                                                      [facility]                    



 

                          Temperature Oral (F)                            36.39 Angela                         

                    2018                                                        [facility]     

               

 

                    Respitory Rate                            18                             2018 

                                                       [facility]                 

   



                                                                                
                                                                       



Encounters

                    





                    Location                            Location Details                            Encounter

 Type                            Encounter Number                            Reason For

 Visit                            Attending Provider                            ADM Date

                            DC Date                            Status                

                                        Source                    

 

                    [facility]                                                        Emergency

                            [id number]                            Rib pain on right side

                            Francesco Bowen MD                            2018                                                  

                                        [facility]                    



                                                                                
   



Procedures

                    





                    Procedure                            Code                            Date           

                          Perfomer                            Comments                        

                                        Source                    

 

                          XRAY RIBS UNILATERAL 2 VIEWS                            [id number]                     

                    2018                            Jessi                                      

                                        [facility]                    

 

                    XRAY CHEST 2 VIEWS                            [id number]                            2018

                            [facility]

## 2019-01-05 NOTE — NUR
DR. PETER IN TO EVAL PT. PT TO BE ADMITTED AND IS AWARE OF THIS. PENDING 
FURTHER OTHERS AT THIS TIME AND PT AWARE OF THIS.
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
## 2019-01-05 NOTE — DIAGNOSTIC IMAGING REPORT
EXAMINATION:  CHEST SINGLE (PORTABLE)   



COMPARISON:  None



INDICATION:  

^HYPOTENSIVE, N/V/ABD PAIN

^20190105

^1435    



DISCUSSION:

Frontal view of the chest obtained at 1449 hours.



HEART AND MEDIASTINUM:  The heart is top normal in size to mildly enlarged.

This likely secondary to portable technique

  

LINES:  None.



LUNGS:  Low lung volumes. No confluent infiltrates or interstitial thickening.

No pneumonia or pulmonary edema.



PLEURA:  No pleural effusion or pneumothorax.



BONES AND SOFT TISSUES: There are degenerative changes of the spine. No focal

osseous lesion. The soft tissues are normal.





IMPRESSION: 

Low lung volumes.  Prominent cardiac silhouette may be secondary to portable

technique. No acute cardiopulmonary process.



Signed by: Dr. Yasmin Rodriguez MD on 1/5/2019 3:16 PM

## 2019-01-05 NOTE — DIAGNOSTIC IMAGING REPORT
CT Abdomen And Pelvis Without IV Contrast



INDICATION: Nausea, vomiting, abdominal pain

TECHNIQUE: 5 mm collimation axial images obtained from the diaphragm to the

level of the pubic symphysis without nonionic intravenous contrast. 

 Oral contrast was administered.





RADIATION DOSE:

     Total DLP: 734.2 mGy*cm

     Estimated effective dose: (DLP x 0.015 x size factor) mSv

     CTDIvol has been reviewed. It is below the limits set by the Radiation

Protocol Committee (RPC).



Dose reduction techniques used: Automated exposure control, adjustment of the

mAs and/or kVp according to patient size, standardized low-dose protocol,

and/or iterative reconstruction technique.



COMPARISON: None.

 

ABDOMEN FINDINGS:



Lung Bases: Minimal bibasilar atelectasis. There are prominent pericardial fat

pads. No pericardial or pleural effusions.



Liver: Normal in attenuation without mass.



Gallbladder: Present and appears normal.  No ductal dilatation.



Pancreas: Diffuse fatty atrophy without mass or ductal dilatation. No pancreas

calcifications.



Spleen: Normal.



Adrenal Glands: No evidence for mass.



Kidneys:



Right Kidney:  No renal calculus.  No cortical mass or hydronephrosis.



Left Kidney:  No renal calculus.  Peripelvic or parapelvic cyst in the lower

pole measures 20 mm. There is a circumaortic left renal vein.



Lymph Nodes: No enlarged abdominal or retroperitoneal lymph nodes..



Aorta: Normal in diameter.



PELVIS FINDINGS: 



Bowel: 

Stomach: Contains enteric contrast. No dilatation or mural thickening.

Small Bowel: Enteric contrast reaches the mid small bowel. Small bowel loops

are normal in diameter with normal wall thickness.

Large Bowel: Diverticulosis predominantly of the sigmoid colon with mural

thickening suggestive of previous bouts of diverticulitis. No acute

inflammation. No large bowel dilatation. Mild to moderate burden of stool in

the right colon extending to the mid transverse colon.

Appendix: Normal.



Bladder: Collapsed around a Leonard catheter and contains nondependent air.

Prostate gland and seminal vesicles are unremarkable.

The ureters are normal in diameter. No evidence of calculus.



Lymph Nodes: No enlarged mesenteric, pelvic, or inguinal lymph nodes.



No free fluid or fluid collection.



Bones: Mild degenerative changes of the spine. No compression deformities. No

focal osseous lesions.



Soft tissues: There is fat along the spermatic cords. No bowel containing

hernia.



IMPRESSION:



1.  Diverticulosis coli. No evidence for bowel obstruction or inflammation.

Normal appendix



2.  No evidence of renal calculus or obstructive uropathy.



3. Pancreas lipomatosis.



Signed by: Dr. Yasmin Rodriguez MD on 1/5/2019 5:09 PM

## 2019-01-05 NOTE — XMS REPORT
Patient Summary Document

                             Created on: 2019



JC PRESSLEY

External Reference #: [id number]

: 1970

Sex: Male



Demographics







                          Address                   [address] [place] [address]  [id number]

 

                          Preferred Language        Unknown

 

                          Marital Status            Unknown

 

                          Taoism Affiliation     Unknown

 

                          Race                      Unknown

 

                          Ethnic Group              Unknown





Author







                          Author                    [facility]

 

                          Address                   Unknown

 

                          Phone                     Unavailable







Care Team Providers







                    Care Team Member Name    Role                Phone

 

                          Unavailable               Unavailable







Problems

This patient has no known problems.



Allergies, Adverse Reactions, Alerts

This patient has no known allergies or adverse reactions.



Medications

This patient has no known medications.



Encounters







             Start Date/Time    End Date/Time    Encounter Type    Admission Type    Attending [facility]       Care Department     Encounter ID

 

        2018 21:30:00    2018 21:30:00    Emergency                    [facility]     MED     [id number]

 

        2018 18:08:40    2018 18:08:40    Outpatient                    [facility]     [id number]

 

        2018 04:05:54    2018 04:05:54    Emergency                    [facility]     [id number]

## 2019-01-05 NOTE — NUR
PT'S B/P 70/40'S-80/60'S -116. DR. PETERSON AWARE OF DECREASED B/P AND 
ELVEATED HR AND IS NOW AT BEDSIDE TALKING WITH PT. PT MOVED FROM ER RM4 TO ER 
RM 5. PT C/O SEVERE LUQ ABD PAIN AT THIS TIME. PT STATES HE HAS VOMITED BLOOD 
IN LAST 24 HRS, DENIES ANY RECTAL BLEEDING OR DARK STOOLS AT THIS TIME.

## 2019-01-05 NOTE — XMS REPORT
Clinical Summary

                             Created on: 2018



JC PRESSLEY

External Reference #: [id number]

: 1970

Sex: Male



Demographics







                          Address                   [address] [place]  [address]

 

                          Home Phone                [phone]

 

                          Preferred Language        Unknown

 

                          Marital Status            

 

                          Yarsanism Affiliation     PRS

 

                          Race                      Unknown

 

                          Ethnic Group              Unknown





Author







                          Author                    [facility]

 

                          Organization              [facility]

 

                          Address                   Unknown

 

                          Phone                     Unavailable







Support







                Name            Relationship    Address         Phone

 

                    Zeus Pressley                [address]

[place]  [address]                      [phone]







Care Team Providers







                    Care Team Member Name    Role                Phone

 

                          PCP                       Unavailable







Allergies

No Known Allergies



Current Medications







      



           Prescription     Sig.      Disp.     Refills     Start     End Date     Status



                                         Date  

 

      



            acetaminophen-codeine     Take 1 tablet by mouth     20 tablet     0          20      Active





                     (TYLENOL/CODEINE #3)     every 4 hours as needed       18  



                           300-30 mg per             for Pain.     



                                         tabletIndications: Rib      



                                         pain on right side      







Active Problems







 



                           Problem                   Noted Date

 

 



                           Rib pain on right side     2018







Encounters







    



              Date         Type         Specialty     Care Team     Description

 

    



              2018     Emergency     Emergency Medicine     Francesco Bowen MD     Rib pain on

 right side



                           -                         (Primary Dx)



                                         2018    



after 2017



Social History







    



              Tobacco Use     Types        Packs/Day     Years Used     Date

 

    



                                         Current Every Day Smoker    

 

    



                                         Smokeless Tobacco: Never   



                                         Used   









   



                 Alcohol Use     Drinks/Week     oz/Week         Comments

 

   



                                         No   









 



                           Sex Assigned at Birth     Date Recorded

 

 



                                         Not on file 







Last Filed Vital Signs







  



                     Vital Sign          Reading             Time Taken

 

  



                     Blood Pressure      168/98              2018  6:00 AM CDT

 

  



                     Pulse               100                 2018  6:00 AM CDT

 

  



                     Temperature         36.4 C (97.5 F)     2018  6:00 AM CDT

 

  



                     Respiratory Rate     18                  2018  6:00 AM CDT

 

  



                     Oxygen Saturation     98%                 2018  6:00 AM CDT

 

  



                     Inhaled Oxygen      -                   -



                                         Concentration  

 

  



                     Weight              -                   -

 

  



                     Height              -                   -

 

  



                     Body Mass Index     -                   -







Plan of Treatment







   



                 Health Maintenance     Due Date        Last Done       Comments

 

   



                           IMM Influenza Seasonal     10/01/2018  



                                         Oct to March (>/=19 yrs)   







Procedures







    



              Procedure Name     Priority     Date/Time     Associated Diagnosis     Comments

 

    



              XRAY RIBS UNILATERAL 2     STAT         2018     Rib pain on right side     Results for

 this



                     VIEWS               4:13 AM CDT         procedure are in the



                                         results section.

 

    



              XRAY CHEST 2 VIEWS     STAT         2018     Rib pain on right side     Results for this





                           6:17 PM CDT               procedure are in the



                                         results section.



after 2017



Results

* XRAY RIBS UNILATERAL 2 VIEWS (2018  4:13 AM)





 



                           Impressions               Performed At

 

 



                           IMPRESSION:               [facility]



                                         No displaced rib fracture. 



                                         Dictated By: Omi Hernandez MD, 2018 5:04 AM 



                                         I have reviewed the study and agree with the findings in this report. 



                                         Signed By: Erin Bennett MD, 2018 5:17 AM 









 



                           Narrative                 Performed At

 

 



                           EXAM: Right rib series: 4 view(s)     [facility]



                                         HISTORY: right rib pain 



                                         COMPARISON: Chest x-ray on 2018. 



                                         DISCUSSION: 



                                         No acute displaced fracture. 



                                         No pneumothorax. 



                                         Prominent right epicardial fat pad.Right middle lobe density and lungs 



                                         better assessed on prior chest two view xray. 



                                         The soft tissues are unremarkable. 









                                        Procedure Note

 

                                        



Interface, Rad/Mammog In - 2018  5:22 AM CDT



EXAM: Right rib series: 4 view(s)



HISTORY: right rib pain

COMPARISON: Chest x-ray on 2018.



DISCUSSION:



No acute displaced fracture.

No pneumothorax. 

Prominent right epicardial fat pad.  Right middle lobe density and lungs

better assessed on prior chest two view xray.

The soft tissues are unremarkable.



IMPRESSION

IMPRESSION:

No displaced rib fracture.



Dictated By: Omi Hernandez MD, 2018 5:04 AM



I have reviewed the study and agree with the findings in this report.



Signed By: Erin Bennett MD, 2018 5:17 AM











   



                 Performing Organization     Address         City/State/Zipcode     Phone Number

 

   



                                         [facility]   





* XRAY CHEST 2 VIEWS (2018  6:17 PM)





 



                           Impressions               Performed At

 

 



                           IMPRESSION:               [facility]



                                         Mild irregularity of the right seventh rib may be due to mildly 



                                         displaced fracture. Recommend further evaluation with right-sided ribs 



                                         series. 



                                         Right middle lobe airspace opacity obscuring the right heart border due 



                                         to atelectasis, contusion, or pneumonia. Recommend follow-up chest 



                                         radiograph in 4-6 weeks. 



                                         Discussed findings with Dr. Menezes at 8:00 PM on 2018. 



                                         Dictated By: Omi Hernandez MD, 2018 8:02 PM 



                                         I have reviewed the study and agree with the findings in this report. 



                                         Signed By: Tiffani Veloz MD, 2018 8:03 PM 









 



                           Narrative                 Performed At

 

 



                           EXAMINATION:XRAY CHEST 2 VIEWS     [facility]



                                         INDICATION: right rib pain 



                                         COMPARISON:None 



                                          



                                         FINDINGS: 



                                         TUBES and LINES:None. 



                                         LUNGS:Right middle lobe airspace opacity obscuring the right heart 



                                         border. Bibasilar segmental atelectasis. 



                                         PLEURA:No pleural effusion or pneumothorax. 



                                         HEART AND MEDIASTINUM: Bilateral opacified basilar heart borders. The 



                                         cardiac silhouette silhouette is mildly enlarged. 



                                         BONES AND SOFT TISSUES:Mild irregularity of the lateral aspect of the 



                                         right seventh rib.Soft tissues are unremarkable. 



                                         UPPER ABDOMEN: No free air under the diaphragm. 









                                        Procedure Note

 

                                        



Interface, Rad/Mammog In - 2018  8:09 PM CDT



EXAMINATION:  XRAY CHEST 2 VIEWS    



INDICATION: right rib pain  



COMPARISON:  None

     

FINDINGS:

TUBES and LINES:  None.



LUNGS:  Right middle lobe airspace opacity obscuring the right heart

border. Bibasilar segmental atelectasis.



PLEURA:  No pleural effusion or pneumothorax.



HEART AND MEDIASTINUM: Bilateral opacified basilar heart borders. The

cardiac silhouette silhouette is mildly enlarged.



BONES AND SOFT TISSUES:  Mild irregularity of the lateral aspect of the

right seventh rib.  Soft tissues are unremarkable.



UPPER ABDOMEN: No free air under the diaphragm.    



IMPRESSION

IMPRESSION: 

Mild irregularity of the right seventh rib may be due to mildly

displaced fracture. Recommend further evaluation with right-sided ribs

series.



Right middle lobe airspace opacity obscuring the right heart border due

to atelectasis, contusion, or pneumonia. Recommend follow-up chest

radiograph in 4-6 weeks.





Discussed findings with Dr. Menezes at 8:00 PM on 2018.



Dictated By: Omi Hernandez MD, 2018 8:02 PM



I have reviewed the study and agree with the findings in this report.



Signed By: Tiffani Veloz MD, 2018 8:03 PM











   



                 Performing Organization     Address         City/State/Zipcode     Phone Number

 

   



                                         [facility]   





after 2017

## 2019-01-05 NOTE — DIAGNOSTIC IMAGING REPORT
EXAM: Renal Ultrasound

INDICATION:   JENNIFER       

COMPARISON: CT 1/5/2019.

TECHNIQUE: Transverse and longitudinal images of the kidneys and bladder were

obtained.  



FINDINGS:     



Right Kidney: 

     Size: 12.5 cm

     Echogenicity: Normal   

     Parenchymal thickness: Normal 

     Collecting system: No hydronephrosis

     Stones: None

     Cyst/Mass: None



Left Kidney: 

     Size: 11.9 cm

     Echogenicity: Normal   

     Parenchymal thickness: Normal 

     Collecting system: No hydronephrosis

     Stones: None

     Cyst/Mass: Superior pole 2.4 x 3.3 x 3 cm simple cyst.



Bladder: 

Collapsed with Leonard catheter in place, limiting evaluation.



IMPRESSION:

No hydronephrosis.

Left renal simple cyst. 



Signed by: DR. Henry Roberts MD on 1/5/2019 7:22 PM

## 2019-01-06 VITALS — DIASTOLIC BLOOD PRESSURE: 65 MMHG | SYSTOLIC BLOOD PRESSURE: 97 MMHG

## 2019-01-06 VITALS — DIASTOLIC BLOOD PRESSURE: 72 MMHG | SYSTOLIC BLOOD PRESSURE: 116 MMHG

## 2019-01-06 VITALS — DIASTOLIC BLOOD PRESSURE: 102 MMHG | SYSTOLIC BLOOD PRESSURE: 159 MMHG

## 2019-01-06 VITALS — SYSTOLIC BLOOD PRESSURE: 95 MMHG | DIASTOLIC BLOOD PRESSURE: 62 MMHG

## 2019-01-06 VITALS — SYSTOLIC BLOOD PRESSURE: 101 MMHG | DIASTOLIC BLOOD PRESSURE: 75 MMHG

## 2019-01-06 VITALS — DIASTOLIC BLOOD PRESSURE: 57 MMHG | SYSTOLIC BLOOD PRESSURE: 97 MMHG

## 2019-01-06 VITALS — DIASTOLIC BLOOD PRESSURE: 56 MMHG | SYSTOLIC BLOOD PRESSURE: 96 MMHG

## 2019-01-06 VITALS — SYSTOLIC BLOOD PRESSURE: 90 MMHG | DIASTOLIC BLOOD PRESSURE: 52 MMHG

## 2019-01-06 VITALS — DIASTOLIC BLOOD PRESSURE: 47 MMHG | SYSTOLIC BLOOD PRESSURE: 84 MMHG

## 2019-01-06 VITALS — DIASTOLIC BLOOD PRESSURE: 66 MMHG | SYSTOLIC BLOOD PRESSURE: 103 MMHG

## 2019-01-06 VITALS — SYSTOLIC BLOOD PRESSURE: 93 MMHG | DIASTOLIC BLOOD PRESSURE: 61 MMHG

## 2019-01-06 VITALS — SYSTOLIC BLOOD PRESSURE: 113 MMHG | DIASTOLIC BLOOD PRESSURE: 68 MMHG

## 2019-01-06 VITALS — DIASTOLIC BLOOD PRESSURE: 66 MMHG | SYSTOLIC BLOOD PRESSURE: 105 MMHG

## 2019-01-06 VITALS — SYSTOLIC BLOOD PRESSURE: 104 MMHG | DIASTOLIC BLOOD PRESSURE: 74 MMHG

## 2019-01-06 VITALS — DIASTOLIC BLOOD PRESSURE: 59 MMHG | SYSTOLIC BLOOD PRESSURE: 105 MMHG

## 2019-01-06 VITALS — DIASTOLIC BLOOD PRESSURE: 73 MMHG | SYSTOLIC BLOOD PRESSURE: 110 MMHG

## 2019-01-06 VITALS — SYSTOLIC BLOOD PRESSURE: 116 MMHG | DIASTOLIC BLOOD PRESSURE: 72 MMHG

## 2019-01-06 VITALS — SYSTOLIC BLOOD PRESSURE: 100 MMHG | DIASTOLIC BLOOD PRESSURE: 78 MMHG

## 2019-01-06 VITALS — SYSTOLIC BLOOD PRESSURE: 88 MMHG | DIASTOLIC BLOOD PRESSURE: 75 MMHG

## 2019-01-06 VITALS — DIASTOLIC BLOOD PRESSURE: 66 MMHG | SYSTOLIC BLOOD PRESSURE: 100 MMHG

## 2019-01-06 VITALS — SYSTOLIC BLOOD PRESSURE: 96 MMHG | DIASTOLIC BLOOD PRESSURE: 56 MMHG

## 2019-01-06 VITALS — SYSTOLIC BLOOD PRESSURE: 98 MMHG | DIASTOLIC BLOOD PRESSURE: 59 MMHG

## 2019-01-06 VITALS — DIASTOLIC BLOOD PRESSURE: 95 MMHG | SYSTOLIC BLOOD PRESSURE: 118 MMHG

## 2019-01-06 VITALS — SYSTOLIC BLOOD PRESSURE: 102 MMHG | DIASTOLIC BLOOD PRESSURE: 55 MMHG

## 2019-01-06 LAB
ALBUMIN SERPL-MCNC: 3 G/DL (ref 3.5–5)
ALBUMIN/GLOB SERPL: 1.1 {RATIO} (ref 0.8–2)
ALP SERPL-CCNC: 46 IU/L (ref 40–150)
ALT SERPL-CCNC: 20 IU/L (ref 0–55)
ANION GAP SERPL CALC-SCNC: 11.1 MMOL/L (ref 8–16)
BASOPHILS # BLD AUTO: 0.1 10*3/UL (ref 0–0.1)
BASOPHILS NFR BLD AUTO: 0.8 % (ref 0–1)
BUN SERPL-MCNC: 46 MG/DL (ref 7–26)
BUN/CREAT SERPL: 13 (ref 6–25)
CALCIUM SERPL-MCNC: 8.2 MG/DL (ref 8.4–10.2)
CHLORIDE SERPL-SCNC: 106 MMOL/L (ref 98–107)
CK MB SERPL-MCNC: 2.5 NG/ML (ref 0–5)
CK SERPL-CCNC: 226 IU/L (ref 30–200)
CO2 SERPL-SCNC: 25 MMOL/L (ref 22–29)
DEPRECATED FTI SERPL-MCNC: 1.72 UG/DL (ref 1.4–3.8)
DEPRECATED NEUTROPHILS # BLD AUTO: 3.3 10*3/UL (ref 2.1–6.9)
EGFRCR SERPLBLD CKD-EPI 2021: 19 ML/MIN (ref 60–?)
EOSINOPHIL # BLD AUTO: 0.4 10*3/UL (ref 0–0.4)
EOSINOPHIL NFR BLD AUTO: 5.9 % (ref 0–6)
ERYTHROCYTE [DISTWIDTH] IN CORD BLOOD: 12.9 % (ref 11.7–14.4)
GLOBULIN PLAS-MCNC: 2.7 G/DL (ref 2.3–3.5)
GLUCOSE SERPLBLD-MCNC: 115 MG/DL (ref 74–118)
HCT VFR BLD AUTO: 31.9 % (ref 38.2–49.6)
HGB BLD-MCNC: 10.6 G/DL (ref 14–18)
IRON SATN MFR SERPL: 43 % (ref 15–50)
IRON SERPL-MCNC: 117 UG/DL (ref 65–175)
LYMPHOCYTES # BLD: 1.7 10*3/UL (ref 1–3.2)
LYMPHOCYTES NFR BLD AUTO: 27.9 % (ref 18–39.1)
MCH RBC QN AUTO: 31.4 PG (ref 28–32)
MCHC RBC AUTO-ENTMCNC: 33.2 G/DL (ref 31–35)
MCV RBC AUTO: 94.4 FL (ref 81–99)
MONOCYTES # BLD AUTO: 0.6 10*3/UL (ref 0.2–0.8)
MONOCYTES NFR BLD AUTO: 9.6 % (ref 4.4–11.3)
NEUTS SEG NFR BLD AUTO: 55.6 % (ref 38.7–80)
PLATELET # BLD AUTO: 196 X10E3/UL (ref 140–360)
POTASSIUM SERPL-SCNC: 4.1 MMOL/L (ref 3.5–5.1)
RBC # BLD AUTO: 3.38 X10E6/UL (ref 4.3–5.7)
SODIUM SERPL-SCNC: 138 MMOL/L (ref 136–145)
TIBC SERPL-MCNC: 272 UG/DL (ref 261–478)
TRANSFERRIN SERPL-MCNC: 194 MG/DL (ref 174–364)
TSH SERPL DL<=0.005 MIU/L-ACNC: 0.32 UIU/ML (ref 0.35–4.94)

## 2019-01-06 RX ADMIN — Medication PRN MG: at 17:47

## 2019-01-06 RX ADMIN — CEFEPIME SCH MLS/HR: 1 INJECTION, SOLUTION INTRAVENOUS at 03:36

## 2019-01-06 RX ADMIN — METHOCARBAMOL SCH MG: 500 TABLET ORAL at 12:26

## 2019-01-06 RX ADMIN — CEFEPIME SCH MLS/HR: 1 INJECTION, SOLUTION INTRAVENOUS at 16:00

## 2019-01-06 RX ADMIN — DEXTROSE AND SODIUM CHLORIDE SCH MLS/HR: 5; 900 INJECTION, SOLUTION INTRAVENOUS at 17:42

## 2019-01-06 RX ADMIN — METHOCARBAMOL SCH MG: 500 TABLET ORAL at 17:42

## 2019-01-06 RX ADMIN — DEXTROSE AND SODIUM CHLORIDE SCH MLS/HR: 5; 900 INJECTION, SOLUTION INTRAVENOUS at 14:37

## 2019-01-06 RX ADMIN — DEXTROSE AND SODIUM CHLORIDE SCH MLS/HR: 5; 900 INJECTION, SOLUTION INTRAVENOUS at 05:35

## 2019-01-06 RX ADMIN — Medication PRN MG: at 10:03

## 2019-01-06 RX ADMIN — SODIUM CHLORIDE SCH MG: 900 INJECTION INTRAVENOUS at 09:13

## 2019-01-06 NOTE — NUR
Pt transferred from ICU at this time. Pt is aox4 and able to verbalize needs. Pt is able to 
ambulate independently.

## 2019-01-07 VITALS — DIASTOLIC BLOOD PRESSURE: 91 MMHG | SYSTOLIC BLOOD PRESSURE: 159 MMHG

## 2019-01-07 VITALS — SYSTOLIC BLOOD PRESSURE: 135 MMHG | DIASTOLIC BLOOD PRESSURE: 80 MMHG

## 2019-01-07 VITALS — SYSTOLIC BLOOD PRESSURE: 114 MMHG | DIASTOLIC BLOOD PRESSURE: 63 MMHG

## 2019-01-07 VITALS — SYSTOLIC BLOOD PRESSURE: 136 MMHG | DIASTOLIC BLOOD PRESSURE: 78 MMHG

## 2019-01-07 VITALS — DIASTOLIC BLOOD PRESSURE: 92 MMHG | SYSTOLIC BLOOD PRESSURE: 156 MMHG

## 2019-01-07 VITALS — SYSTOLIC BLOOD PRESSURE: 136 MMHG | DIASTOLIC BLOOD PRESSURE: 83 MMHG

## 2019-01-07 VITALS — DIASTOLIC BLOOD PRESSURE: 78 MMHG | SYSTOLIC BLOOD PRESSURE: 136 MMHG

## 2019-01-07 LAB
ANION GAP SERPL CALC-SCNC: 10.2 MMOL/L (ref 8–16)
BASOPHILS # BLD AUTO: 0.1 10*3/UL (ref 0–0.1)
BASOPHILS NFR BLD AUTO: 0.8 % (ref 0–1)
BUN SERPL-MCNC: 19 MG/DL (ref 7–26)
BUN/CREAT SERPL: 17 (ref 6–25)
CALCIUM SERPL-MCNC: 8.7 MG/DL (ref 8.4–10.2)
CHLORIDE SERPL-SCNC: 105 MMOL/L (ref 98–107)
CO2 SERPL-SCNC: 29 MMOL/L (ref 22–29)
DEPRECATED NEUTROPHILS # BLD AUTO: 3.8 10*3/UL (ref 2.1–6.9)
EGFRCR SERPLBLD CKD-EPI 2021: > 60 ML/MIN (ref 60–?)
EOSINOPHIL # BLD AUTO: 0.3 10*3/UL (ref 0–0.4)
EOSINOPHIL NFR BLD AUTO: 5.1 % (ref 0–6)
ERYTHROCYTE [DISTWIDTH] IN CORD BLOOD: 11.9 % (ref 11.7–14.4)
GLUCOSE SERPLBLD-MCNC: 113 MG/DL (ref 74–118)
HCT VFR BLD AUTO: 30.8 % (ref 38.2–49.6)
HGB BLD-MCNC: 10.5 G/DL (ref 14–18)
LYMPHOCYTES # BLD: 1.3 10*3/UL (ref 1–3.2)
LYMPHOCYTES NFR BLD AUTO: 22.5 % (ref 18–39.1)
MCH RBC QN AUTO: 31 PG (ref 28–32)
MCHC RBC AUTO-ENTMCNC: 34.1 G/DL (ref 31–35)
MCV RBC AUTO: 90.9 FL (ref 81–99)
MONOCYTES # BLD AUTO: 0.4 10*3/UL (ref 0.2–0.8)
MONOCYTES NFR BLD AUTO: 7.1 % (ref 4.4–11.3)
NEUTS SEG NFR BLD AUTO: 64.3 % (ref 38.7–80)
PLATELET # BLD AUTO: 205 X10E3/UL (ref 140–360)
POTASSIUM SERPL-SCNC: 4.2 MMOL/L (ref 3.5–5.1)
RBC # BLD AUTO: 3.39 X10E6/UL (ref 4.3–5.7)
SODIUM SERPL-SCNC: 140 MMOL/L (ref 136–145)

## 2019-01-07 RX ADMIN — SODIUM CHLORIDE PRN MG: 900 INJECTION INTRAVENOUS at 14:03

## 2019-01-07 RX ADMIN — CEFEPIME SCH MLS/HR: 1 INJECTION, SOLUTION INTRAVENOUS at 04:10

## 2019-01-07 RX ADMIN — CYANOCOBALAMIN SCH MCG: 1000 INJECTION, SOLUTION INTRAMUSCULAR at 08:23

## 2019-01-07 RX ADMIN — Medication PRN MG: at 21:58

## 2019-01-07 RX ADMIN — Medication PRN MG: at 08:53

## 2019-01-07 RX ADMIN — DEXTROSE AND SODIUM CHLORIDE SCH MLS/HR: 5; 900 INJECTION, SOLUTION INTRAVENOUS at 14:00

## 2019-01-07 RX ADMIN — Medication PRN MG: at 08:23

## 2019-01-07 RX ADMIN — METRONIDAZOLE SCH MLS/HR: 500 INJECTION, SOLUTION INTRAVENOUS at 18:35

## 2019-01-07 RX ADMIN — METRONIDAZOLE SCH MLS/HR: 500 INJECTION, SOLUTION INTRAVENOUS at 05:34

## 2019-01-07 RX ADMIN — BUTALBITAL, ACETAMINOPHEN, AND CAFFEINE PRN EA: 325; 50; 40 TABLET ORAL at 18:01

## 2019-01-07 RX ADMIN — Medication PRN MG: at 03:15

## 2019-01-07 RX ADMIN — SODIUM CHLORIDE SCH MG: 900 INJECTION INTRAVENOUS at 08:23

## 2019-01-07 RX ADMIN — SODIUM CHLORIDE PRN MG: 900 INJECTION INTRAVENOUS at 08:44

## 2019-01-07 RX ADMIN — SODIUM CHLORIDE PRN MG: 900 INJECTION INTRAVENOUS at 17:57

## 2019-01-07 RX ADMIN — METRONIDAZOLE SCH MLS/HR: 500 INJECTION, SOLUTION INTRAVENOUS at 12:27

## 2019-01-07 RX ADMIN — CEFEPIME SCH MLS/HR: 1 INJECTION, SOLUTION INTRAVENOUS at 16:50

## 2019-01-07 RX ADMIN — Medication SCH MG: at 14:37

## 2019-01-07 RX ADMIN — AMLODIPINE BESYLATE SCH MG: 5 TABLET ORAL at 08:23

## 2019-01-07 RX ADMIN — Medication PRN MG: at 17:56

## 2019-01-07 RX ADMIN — METHOCARBAMOL SCH MG: 500 TABLET ORAL at 16:50

## 2019-01-07 RX ADMIN — Medication PRN MG: at 05:15

## 2019-01-07 RX ADMIN — DEXTROSE AND SODIUM CHLORIDE SCH MLS/HR: 5; 900 INJECTION, SOLUTION INTRAVENOUS at 23:42

## 2019-01-07 RX ADMIN — Medication PRN MG: at 01:00

## 2019-01-07 RX ADMIN — BUTALBITAL, ACETAMINOPHEN, AND CAFFEINE PRN EA: 325; 50; 40 TABLET ORAL at 11:13

## 2019-01-07 RX ADMIN — DEXTROSE AND SODIUM CHLORIDE SCH MLS/HR: 5; 900 INJECTION, SOLUTION INTRAVENOUS at 05:49

## 2019-01-07 RX ADMIN — Medication PRN MG: at 14:04

## 2019-01-07 RX ADMIN — METHOCARBAMOL SCH MG: 500 TABLET ORAL at 08:23

## 2019-01-07 NOTE — NUR
Patient vomiting at this time. PRN nausea meds given. Patient c/o a headache and pressure 
behind his eyes. Will inform MD. PRN pain meds given as well

## 2019-01-07 NOTE — NUR
Patient continues to c/o blurry vision and difficulty seeing at this time. Patient going to 
CT at this time.

## 2019-01-07 NOTE — NUR
SPOKE TO DR. RAMSES PETER AT THIS TIME. NEW ORDERS RCV FOR MILK OF MAGNESIA AND CITRATE OF 
MAGNESIA.

-------------------------------------------------------------------------------

Addendum: 01/07/19 at 2303 by Israel Pal RN

-------------------------------------------------------------------------------

ALSO DULCOLAX SUPPOSITORY
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
## 2019-01-07 NOTE — DIAGNOSTIC IMAGING REPORT
Exam: Head CT without contrast

History:  Headache, migraine

Comparison studies:  None



Technique:

Axial images were obtained from the skull base to the vertex.

Coronal and sagittal images reconstructed from the axial data.  Dose

modulation, iterative reconstruction, and/or weight based adjustment of the

mA/kV was utilized to reduce the radiation dose to as low as reasonably

achievable.  



Radiation dose: 

Total DLP: 1036 mGy*cm. 

Estimated effective dose: DLP x 0.015 

Intravenous contrast: None



Findings:



Scalp: No abnormalities.

Bones: No fractures, blastic or lytic lesions.



Brain sulci: Appropriate for age.

Ventricles: Normal in size and configuration. No hydrocephalus.

Extra-axial spaces: No masses, no fluid collection. 



Parenchyma: 

No abnormal densities. 

No masses, acute hemorrhage, acute or chronic vascular insults.



Sellar/suprasellar region: Partially CSF filled sella, a nonspecific finding

which may be of no clinical significance, especially in the absence of symptoms

to suggest idiopathic intracranial hypertension or abnormalities referrable to

the hypothalamic-pituitary axis.

Craniocervical junction: Patent foramen magnum. No Chiari one malformation.



Paranasal sinuses: Mild scattered nonspecific inflammatory mucosal thickening

throughout the included sinuses. Changes of prior endoscopic sinus surgery with

bilateral medial antrostomies, uncinectomies, turbinate reduction,

ethmoidectomies, left sphenoid sinusotomy and right frontal sinusotomy. 





IMPRESSION:

 

1.  Incidental partially CSF filled sella, a nonspecific finding as described.



2.  No other intracranial abnormalities.



Signed by: Dr. Salvatore Sawant M.D. on 1/7/2019 12:25 PM

## 2019-01-07 NOTE — NUR
Patient returned from CT scan. Patient continues to c/o headache. Informed patient that he 
is not due for pain meds until later on. Patient verbalized understanding.

## 2019-01-07 NOTE — NUR
Patient is AAOx3. Patient lung fields clear to auscultation. Bowel sounds present x4. 
Patient c/o pain in his left lower quadrant. PRN pain meds given this am. Patient had an 
episode of vomiting. No loose stools noted. No edema noted. IV fluids infusing. Patient 
tolerated his soft diet.

## 2019-01-07 NOTE — NUR
Left forearm IV in place noted to be swollen and red. Warm compress applied. Patient c/o 
pain in IV site. Will attempt to start a new IV

## 2019-01-07 NOTE — NUR
Headache has improved at this time but patient states he has a dull ache now. No other s/s 
of distress noted

## 2019-01-08 VITALS — SYSTOLIC BLOOD PRESSURE: 137 MMHG | DIASTOLIC BLOOD PRESSURE: 77 MMHG

## 2019-01-08 VITALS — SYSTOLIC BLOOD PRESSURE: 149 MMHG | DIASTOLIC BLOOD PRESSURE: 74 MMHG

## 2019-01-08 VITALS — SYSTOLIC BLOOD PRESSURE: 151 MMHG | DIASTOLIC BLOOD PRESSURE: 96 MMHG

## 2019-01-08 VITALS — SYSTOLIC BLOOD PRESSURE: 133 MMHG | DIASTOLIC BLOOD PRESSURE: 78 MMHG

## 2019-01-08 VITALS — SYSTOLIC BLOOD PRESSURE: 149 MMHG | DIASTOLIC BLOOD PRESSURE: 95 MMHG

## 2019-01-08 VITALS — DIASTOLIC BLOOD PRESSURE: 95 MMHG | SYSTOLIC BLOOD PRESSURE: 149 MMHG

## 2019-01-08 VITALS — DIASTOLIC BLOOD PRESSURE: 89 MMHG | SYSTOLIC BLOOD PRESSURE: 133 MMHG

## 2019-01-08 LAB
ANION GAP SERPL CALC-SCNC: 11.8 MMOL/L (ref 8–16)
BASOPHILS # BLD AUTO: 0.1 10*3/UL (ref 0–0.1)
BASOPHILS NFR BLD AUTO: 1.3 % (ref 0–1)
BUN SERPL-MCNC: 11 MG/DL (ref 7–26)
BUN/CREAT SERPL: 11 (ref 6–25)
CALCIUM SERPL-MCNC: 9.2 MG/DL (ref 8.4–10.2)
CHLORIDE SERPL-SCNC: 102 MMOL/L (ref 98–107)
CO2 SERPL-SCNC: 34 MMOL/L (ref 22–29)
DEPRECATED NEUTROPHILS # BLD AUTO: 2.7 10*3/UL (ref 2.1–6.9)
EGFRCR SERPLBLD CKD-EPI 2021: > 60 ML/MIN (ref 60–?)
EOSINOPHIL # BLD AUTO: 0.4 10*3/UL (ref 0–0.4)
EOSINOPHIL NFR BLD AUTO: 7.9 % (ref 0–6)
ERYTHROCYTE [DISTWIDTH] IN CORD BLOOD: 11.7 % (ref 11.7–14.4)
GLUCOSE SERPLBLD-MCNC: 107 MG/DL (ref 74–118)
HCT VFR BLD AUTO: 33.1 % (ref 38.2–49.6)
HGB BLD-MCNC: 11.5 G/DL (ref 14–18)
LYMPHOCYTES # BLD: 1.2 10*3/UL (ref 1–3.2)
LYMPHOCYTES NFR BLD AUTO: 25.7 % (ref 18–39.1)
MCH RBC QN AUTO: 31.4 PG (ref 28–32)
MCHC RBC AUTO-ENTMCNC: 34.7 G/DL (ref 31–35)
MCV RBC AUTO: 90.4 FL (ref 81–99)
MONOCYTES # BLD AUTO: 0.4 10*3/UL (ref 0.2–0.8)
MONOCYTES NFR BLD AUTO: 7.5 % (ref 4.4–11.3)
NEUTS SEG NFR BLD AUTO: 57.4 % (ref 38.7–80)
PLATELET # BLD AUTO: 246 X10E3/UL (ref 140–360)
POTASSIUM SERPL-SCNC: 3.8 MMOL/L (ref 3.5–5.1)
RBC # BLD AUTO: 3.66 X10E6/UL (ref 4.3–5.7)
SODIUM SERPL-SCNC: 144 MMOL/L (ref 136–145)

## 2019-01-08 RX ADMIN — AMLODIPINE BESYLATE SCH MG: 5 TABLET ORAL at 09:42

## 2019-01-08 RX ADMIN — CYANOCOBALAMIN SCH MCG: 1000 INJECTION, SOLUTION INTRAMUSCULAR at 09:42

## 2019-01-08 RX ADMIN — METHYLPREDNISOLONE SODIUM SUCCINATE SCH MG: 125 INJECTION, POWDER, FOR SOLUTION INTRAMUSCULAR; INTRAVENOUS at 18:00

## 2019-01-08 RX ADMIN — Medication PRN MG: at 04:18

## 2019-01-08 RX ADMIN — SODIUM CHLORIDE SCH MG: 900 INJECTION INTRAVENOUS at 09:42

## 2019-01-08 RX ADMIN — METRONIDAZOLE SCH MLS/HR: 500 INJECTION, SOLUTION INTRAVENOUS at 06:03

## 2019-01-08 RX ADMIN — METHOCARBAMOL SCH MG: 500 TABLET ORAL at 16:53

## 2019-01-08 RX ADMIN — CEFEPIME SCH MLS/HR: 1 INJECTION, SOLUTION INTRAVENOUS at 16:53

## 2019-01-08 RX ADMIN — BUTALBITAL, ACETAMINOPHEN, AND CAFFEINE PRN EA: 325; 50; 40 TABLET ORAL at 00:01

## 2019-01-08 RX ADMIN — METRONIDAZOLE SCH MLS/HR: 500 INJECTION, SOLUTION INTRAVENOUS at 19:01

## 2019-01-08 RX ADMIN — METHOCARBAMOL SCH MG: 500 TABLET ORAL at 09:42

## 2019-01-08 RX ADMIN — CEFEPIME SCH MLS/HR: 1 INJECTION, SOLUTION INTRAVENOUS at 04:15

## 2019-01-08 RX ADMIN — Medication PRN MG: at 02:02

## 2019-01-08 RX ADMIN — DEXTROSE AND SODIUM CHLORIDE SCH MLS/HR: 5; 900 INJECTION, SOLUTION INTRAVENOUS at 16:22

## 2019-01-08 RX ADMIN — Medication PRN MG: at 06:45

## 2019-01-08 RX ADMIN — METHYLPREDNISOLONE SODIUM SUCCINATE SCH MG: 125 INJECTION, POWDER, FOR SOLUTION INTRAMUSCULAR; INTRAVENOUS at 15:30

## 2019-01-08 RX ADMIN — Medication SCH MG: at 09:42

## 2019-01-08 RX ADMIN — BUTALBITAL, ACETAMINOPHEN, AND CAFFEINE PRN EA: 325; 50; 40 TABLET ORAL at 06:02

## 2019-01-08 RX ADMIN — METRONIDAZOLE SCH MLS/HR: 500 INJECTION, SOLUTION INTRAVENOUS at 00:01

## 2019-01-08 RX ADMIN — Medication PRN MG: at 10:47

## 2019-01-08 RX ADMIN — METRONIDAZOLE SCH MLS/HR: 500 INJECTION, SOLUTION INTRAVENOUS at 12:00

## 2019-01-08 RX ADMIN — DEXTROSE AND SODIUM CHLORIDE SCH MLS/HR: 5; 900 INJECTION, SOLUTION INTRAVENOUS at 06:00

## 2019-01-08 RX ADMIN — DEXTROSE AND SODIUM CHLORIDE SCH MLS/HR: 5; 900 INJECTION, SOLUTION INTRAVENOUS at 23:20

## 2019-01-08 RX ADMIN — BUTALBITAL, ACETAMINOPHEN, AND CAFFEINE PRN EA: 325; 50; 40 TABLET ORAL at 14:15

## 2019-01-08 NOTE — NUR
Patient seen by Dr. He and orders for Valproate IV, Solu-medrol IV and will review for 
possible discharge tomorrow. Tolerating meds well, no side effects noted, had dinner, no N/V
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
## 2019-01-09 VITALS — DIASTOLIC BLOOD PRESSURE: 86 MMHG | SYSTOLIC BLOOD PRESSURE: 136 MMHG

## 2019-01-09 VITALS — SYSTOLIC BLOOD PRESSURE: 161 MMHG | DIASTOLIC BLOOD PRESSURE: 86 MMHG

## 2019-01-09 VITALS — SYSTOLIC BLOOD PRESSURE: 134 MMHG | DIASTOLIC BLOOD PRESSURE: 77 MMHG

## 2019-01-09 VITALS — SYSTOLIC BLOOD PRESSURE: 138 MMHG | DIASTOLIC BLOOD PRESSURE: 82 MMHG

## 2019-01-09 RX ADMIN — CYANOCOBALAMIN SCH MCG: 1000 INJECTION, SOLUTION INTRAMUSCULAR at 09:19

## 2019-01-09 RX ADMIN — METRONIDAZOLE SCH MLS/HR: 500 INJECTION, SOLUTION INTRAVENOUS at 05:34

## 2019-01-09 RX ADMIN — DEXTROSE AND SODIUM CHLORIDE SCH MLS/HR: 5; 900 INJECTION, SOLUTION INTRAVENOUS at 06:00

## 2019-01-09 RX ADMIN — METRONIDAZOLE SCH MLS/HR: 500 INJECTION, SOLUTION INTRAVENOUS at 00:05

## 2019-01-09 RX ADMIN — METHOCARBAMOL SCH MG: 500 TABLET ORAL at 09:20

## 2019-01-09 RX ADMIN — BUTALBITAL, ACETAMINOPHEN, AND CAFFEINE PRN EA: 325; 50; 40 TABLET ORAL at 09:20

## 2019-01-09 RX ADMIN — BUTALBITAL, ACETAMINOPHEN, AND CAFFEINE PRN EA: 325; 50; 40 TABLET ORAL at 04:02

## 2019-01-09 RX ADMIN — SODIUM CHLORIDE SCH MG: 900 INJECTION INTRAVENOUS at 09:19

## 2019-01-09 RX ADMIN — AMLODIPINE BESYLATE SCH MG: 5 TABLET ORAL at 09:19

## 2019-01-09 RX ADMIN — Medication PRN MG: at 05:36

## 2019-01-09 RX ADMIN — METRONIDAZOLE SCH MLS/HR: 500 INJECTION, SOLUTION INTRAVENOUS at 12:00

## 2019-01-09 RX ADMIN — CEFEPIME SCH MLS/HR: 1 INJECTION, SOLUTION INTRAVENOUS at 04:02

## 2019-01-09 RX ADMIN — Medication SCH MG: at 09:19

## 2019-01-09 NOTE — NUR
PATIENT DISCHARGED VERBALIZED UNDERSTANDING OF D/C INSTRUCTIONS TO RETURN TO REHABILITATION 
CENTER. IV ACCESS DISCONTINUED, PRIOR TO DISCHARGE. PATIENT DECLINED ESCORTING HIM OUT TO 
FRONT OF HOSPITAL HE LEFT AMBULATING ACCOMPANIED BY HIS BROTHER IN LAW.

## 2019-01-14 ENCOUNTER — HOSPITAL ENCOUNTER (EMERGENCY)
Dept: HOSPITAL 88 - ER | Age: 49
Discharge: HOME | End: 2019-01-14
Payer: SELF-PAY

## 2019-01-14 VITALS — WEIGHT: 230 LBS | HEIGHT: 75 IN | BODY MASS INDEX: 28.6 KG/M2

## 2019-01-14 VITALS — SYSTOLIC BLOOD PRESSURE: 134 MMHG | DIASTOLIC BLOOD PRESSURE: 96 MMHG

## 2019-01-14 DIAGNOSIS — R10.13: ICD-10-CM

## 2019-01-14 DIAGNOSIS — R07.89: Primary | ICD-10-CM

## 2019-01-14 DIAGNOSIS — F41.1: ICD-10-CM

## 2019-01-14 DIAGNOSIS — I10: ICD-10-CM

## 2019-01-14 LAB
ALBUMIN SERPL-MCNC: 4.1 G/DL (ref 3.5–5)
ALBUMIN/GLOB SERPL: 1.4 {RATIO} (ref 0.8–2)
ALP SERPL-CCNC: 68 IU/L (ref 40–150)
ALT SERPL-CCNC: 50 IU/L (ref 0–55)
AMYLASE SERPL-CCNC: 71 U/L (ref 25–125)
ANION GAP SERPL CALC-SCNC: 14 MMOL/L (ref 8–16)
BASOPHILS # BLD AUTO: 0.1 10*3/UL (ref 0–0.1)
BASOPHILS NFR BLD AUTO: 0.6 % (ref 0–1)
BUN SERPL-MCNC: 16 MG/DL (ref 7–26)
BUN/CREAT SERPL: 13 (ref 6–25)
CALCIUM SERPL-MCNC: 9.5 MG/DL (ref 8.4–10.2)
CHLORIDE SERPL-SCNC: 106 MMOL/L (ref 98–107)
CK MB SERPL-MCNC: 1 NG/ML (ref 0–5)
CK SERPL-CCNC: 53 IU/L (ref 30–200)
CO2 SERPL-SCNC: 25 MMOL/L (ref 22–29)
DEPRECATED NEUTROPHILS # BLD AUTO: 6.1 10*3/UL (ref 2.1–6.9)
EGFRCR SERPLBLD CKD-EPI 2021: > 60 ML/MIN (ref 60–?)
EOSINOPHIL # BLD AUTO: 0.1 10*3/UL (ref 0–0.4)
EOSINOPHIL NFR BLD AUTO: 1.1 % (ref 0–6)
ERYTHROCYTE [DISTWIDTH] IN CORD BLOOD: 12.6 % (ref 11.7–14.4)
GLOBULIN PLAS-MCNC: 2.9 G/DL (ref 2.3–3.5)
GLUCOSE SERPLBLD-MCNC: 92 MG/DL (ref 74–118)
HCT VFR BLD AUTO: 37.6 % (ref 38.2–49.6)
HGB BLD-MCNC: 12.8 G/DL (ref 14–18)
LIPASE SERPL-CCNC: 64 U/L (ref 8–78)
LYMPHOCYTES # BLD: 2.2 10*3/UL (ref 1–3.2)
LYMPHOCYTES NFR BLD AUTO: 23.4 % (ref 18–39.1)
MCH RBC QN AUTO: 30.9 PG (ref 28–32)
MCHC RBC AUTO-ENTMCNC: 34 G/DL (ref 31–35)
MCV RBC AUTO: 90.8 FL (ref 81–99)
MONOCYTES # BLD AUTO: 0.8 10*3/UL (ref 0.2–0.8)
MONOCYTES NFR BLD AUTO: 8.6 % (ref 4.4–11.3)
NEUTS SEG NFR BLD AUTO: 65.3 % (ref 38.7–80)
PLATELET # BLD AUTO: 372 X10E3/UL (ref 140–360)
POTASSIUM SERPL-SCNC: 4 MMOL/L (ref 3.5–5.1)
RBC # BLD AUTO: 4.14 X10E6/UL (ref 4.3–5.7)
SODIUM SERPL-SCNC: 141 MMOL/L (ref 136–145)

## 2019-01-14 PROCEDURE — 80053 COMPREHEN METABOLIC PANEL: CPT

## 2019-01-14 PROCEDURE — 82553 CREATINE MB FRACTION: CPT

## 2019-01-14 PROCEDURE — 71045 X-RAY EXAM CHEST 1 VIEW: CPT

## 2019-01-14 PROCEDURE — 93005 ELECTROCARDIOGRAM TRACING: CPT

## 2019-01-14 PROCEDURE — 84484 ASSAY OF TROPONIN QUANT: CPT

## 2019-01-14 PROCEDURE — 82150 ASSAY OF AMYLASE: CPT

## 2019-01-14 PROCEDURE — 85025 COMPLETE CBC W/AUTO DIFF WBC: CPT

## 2019-01-14 PROCEDURE — 99284 EMERGENCY DEPT VISIT MOD MDM: CPT

## 2019-01-14 PROCEDURE — 83690 ASSAY OF LIPASE: CPT

## 2019-01-14 PROCEDURE — 36415 COLL VENOUS BLD VENIPUNCTURE: CPT

## 2019-01-14 PROCEDURE — 82550 ASSAY OF CK (CPK): CPT

## 2019-01-14 NOTE — XMS REPORT
Clinical Summary

                             Created on: 2019



JC PRESSLEY

External Reference #: [id number]

: 1970

Sex: Male



Demographics







                          Address                   [address] [place]  [address]

 

                          Home Phone                [phone]

 

                          Preferred Language        Unknown

 

                          Marital Status            

 

                          Confucianism Affiliation     PRS

 

                          Race                      Unknown

 

                          Ethnic Group              Unknown





Author







                          Author                    [facility]

 

                          Organization              [facility]

 

                          Address                   Unknown

 

                          Phone                     Unavailable







Support







                Name            Relationship    Address         Phone

 

                    Jose Zeus        PEREZ                [address]

[place]  [address]                      [phone]







Care Team Providers







                    Care Team Member Name    Role                Phone

 

                          PCP                       Unavailable







Allergies

No Known Allergies



Medications







                          End Date                  Status



              Medication     Sig          Dispensed     Refills      Start  



                                         Date  

 

                                                    Active



              acetaminophen-codeine     Take 1 tablet     20 tablet     0              



                     (TYLENOL/CODEINE #3)     by mouth            8  



                           300-30 mg per             every 4 hours     



                           tabletIndications: Rib     as needed for     



                           pain on right side        Pain.     







Active Problems







 



                           Problem                   Noted Date

 

 



                           Rib pain on right side     2018







Encounters







                          Care Team                 Description



                     Date                Type                Specialty  

 

                                        



Francesco Bowen MD                    Rib pain on right side (Primary Dx)



                     2018          Emergency           Emergency Medicine  



                                         -    



                                         2018    



after 2018



Social History







                                        Date



                 Tobacco Use     Types           Packs/Day       Years Used 

 

                                         



                                         Current Every Day Smoker    

 

    



                                         Smokeless Tobacco: Never   



                                         Used   









   



                 Alcohol Use     Drinks/Week     oz/Week         Comments

 

   



                                         No   









 



                           Sex Assigned at Birth     Date Recorded

 

 



                                         Not on file 









                                        Industry



                           Job Start Date            Occupation 

 

                                        Not on file



                           Not on file               Not on file 









                                        Travel End



                           Travel History            Travel Start 

 





                                         No recent travel history available.







Last Filed Vital Signs







                                        Time Taken



                           Vital Sign                Reading 

 

                                        2018  6:00 AM CDT



                           Blood Pressure            168/98 

 

                                        2018  6:00 AM CDT



                           Pulse                     100 

 

                                        2018  6:00 AM CDT



                           Temperature               36.4 C (97.5 F) 

 

                                        2018  6:00 AM CDT



                           Respiratory Rate          18 

 

                                        2018  6:00 AM CDT



                           Oxygen Saturation         98% 

 

                                        -



                           Inhaled Oxygen            - 



                                         Concentration  

 

                                        -



                           Weight                    - 

 

                                        -



                           Height                    - 

 

                                        -



                           Body Mass Index           - 







Plan of Treatment







   



                 Health Maintenance     Due Date        Last Done       Comments

 

   



                           IMM Influenza Seasonal     10/01/2018  



                                         Oct to March (>/=19 yrs)   







Procedures







                                        Comments



                 Procedure Name     Priority        Date/Time       Associated Diagnosis 

 

                                        



Results for this procedure are in the results section.



                 XRAY RIBS UNILATERAL 2     STAT            2018      Rib pain on right side 



                           VIEWS                     4:13 AM CDT  

 

                                        



Results for this procedure are in the results section.



                 XRAY CHEST 2 VIEWS     STAT            2018      Rib pain on right side 



                                         6:17 PM CDT  



after 2018



Results

* XRAY RIBS UNILATERAL 2 VIEWS (2018  4:13 AM CDT)





 



                           Impressions               Performed At

 

 



                           IMPRESSION:               [facility]



                                         No displaced rib fracture. 



                                         Dictated By: Omi Hernandez MD, 2018 5:04 AM 



                                         I have reviewed the study and agree with the findings in this report. 



                                         Signed By: Erin Bennett MD, 2018 5:17 AM 









 



                           Narrative                 Performed At

 

 



                           EXAM: Right rib series: 4 view(s)     [facility]



                                         HISTORY: right rib pain 



                                         COMPARISON: Chest x-ray on 2018. 



                                         DISCUSSION: 



                                         No acute displaced fracture. 



                                         No pneumothorax. 



                                         Prominent right epicardial fat pad.Right middle lobe density and lungs 



                                         better assessed on prior chest two view xray. 



                                         The soft tissues are unremarkable. 









                                        Procedure Note

 

                                        



Interface, Rad/Mammog In - 2018  5:22 AM CDT



EXAM: Right rib series: 4 view(s)



HISTORY: right rib pain

COMPARISON: Chest x-ray on 2018.



DISCUSSION:



No acute displaced fracture.

No pneumothorax. 

Prominent right epicardial fat pad.  Right middle lobe density and lungs

better assessed on prior chest two view xray.

The soft tissues are unremarkable.



IMPRESSION

IMPRESSION:

No displaced rib fracture.



Dictated By: Omi Hernandez MD, 2018 5:04 AM



I have reviewed the study and agree with the findings in this report.



Signed By: Erin Bennett MD, 2018 5:17 AM











   



                 Performing Organization     Address         City/State/Zipcode     Phone Number

 

   



                                         [facility]   





* XRAY CHEST 2 VIEWS (2018  6:17 PM CDT)





 



                           Impressions               Performed At

 

 



                           IMPRESSION:               [facility]



                                         Mild irregularity of the right seventh rib may be due to mildly 



                                         displaced fracture. Recommend further evaluation with right-sided ribs 



                                         series. 



                                         Right middle lobe airspace opacity obscuring the right heart border due 



                                         to atelectasis, contusion, or pneumonia. Recommend follow-up chest 



                                         radiograph in 4-6 weeks. 



                                         Discussed findings with Dr. Menezes at 8:00 PM on 2018. 



                                         Dictated By: Omi Hernandez MD, 2018 8:02 PM 



                                         I have reviewed the study and agree with the findings in this report. 



                                         Signed By: Tiffani Veloz MD, 2018 8:03 PM 









 



                           Narrative                 Performed At

 

 



                           EXAMINATION:XRAY CHEST 2 VIEWS     [facility]



                                         INDICATION: right rib pain 



                                         COMPARISON:None 



                                          



                                         FINDINGS: 



                                         TUBES and LINES:None. 



                                         LUNGS:Right middle lobe airspace opacity obscuring the right heart 



                                         border. Bibasilar segmental atelectasis. 



                                         PLEURA:No pleural effusion or pneumothorax. 



                                         HEART AND MEDIASTINUM: Bilateral opacified basilar heart borders. The 



                                         cardiac silhouette silhouette is mildly enlarged. 



                                         BONES AND SOFT TISSUES:Mild irregularity of the lateral aspect of the 



                                         right seventh rib.Soft tissues are unremarkable. 



                                         UPPER ABDOMEN: No free air under the diaphragm. 









                                        Procedure Note

 

                                        



Interface, Rad/Mammog In - 2018  8:09 PM CDT



EXAMINATION:  XRAY CHEST 2 VIEWS    



INDICATION: right rib pain  



COMPARISON:  None

     

FINDINGS:

TUBES and LINES:  None.



LUNGS:  Right middle lobe airspace opacity obscuring the right heart

border. Bibasilar segmental atelectasis.



PLEURA:  No pleural effusion or pneumothorax.



HEART AND MEDIASTINUM: Bilateral opacified basilar heart borders. The

cardiac silhouette silhouette is mildly enlarged.



BONES AND SOFT TISSUES:  Mild irregularity of the lateral aspect of the

right seventh rib.  Soft tissues are unremarkable.



UPPER ABDOMEN: No free air under the diaphragm.    



IMPRESSION

IMPRESSION: 

Mild irregularity of the right seventh rib may be due to mildly

displaced fracture. Recommend further evaluation with right-sided ribs

series.



Right middle lobe airspace opacity obscuring the right heart border due

to atelectasis, contusion, or pneumonia. Recommend follow-up chest

radiograph in 4-6 weeks.





Discussed findings with Dr. Menezes at 8:00 PM on 2018.



Dictated By: Omi Hernandez MD, 2018 8:02 PM



I have reviewed the study and agree with the findings in this report.



Signed By: Tiffani Veloz MD, 2018 8:03 PM











   



                 Performing Organization     Address         City/State/Zipcode     Phone Number

 

   



                                         [facility]   





after 2018



Insurance







                                        Type



            Payer      Benefit     Subscriber ID     Effective     Phone      Address 



                           Plan /                    Dates   



                                         Group     

 

                                         



            [facility] SELF-PAY     [facility]       xxxxxxxxx     2018-P     [phone]     [address] [place] [address] 









     



            Guarantor Name     Account     Relation to     Date of     Phone      Billing Address



                     Type                Patient             Birth  

 

     



            Jc Pressley     Personal/F     Self       1970     [phone] [address]               (Home)              [place]



                           [phone]              [place] [address]



                                         (Work)

## 2019-01-14 NOTE — XMS REPORT
Continuity of Care Document

                             Created on: 2018



JC PRESSLEY

External Reference #: [id number]

: 1970

Sex: Male



Demographics







                          Address                   [address] [place]  [address]

 

                          Home Phone                [phone]

 

                          Preferred Language        Unknown

 

                          Marital Status            Unknown

 

                          Amish Affiliation     Unknown

 

                          Race                      Unknown

 

                          Ethnic Group              Unknown





Author







                          Author                    [facility]

 

                          [facility]              Interface

 

                          Address                   Unknown

 

                          Phone                     Unavailable



                                                    



Problems

                    





                    Problem                            Status                            Onset Date     

                          Classification                            Date Reported       

                          Comments                            Source                    

 

                    Rib pain on right side                            Active                            

2018         

                                                      [facility]                    



                                                                                
       



Medications

                    





                    Medication                            Details                            Route      

                          Status                            Patient Instructions         

                          Ordering Provider                            Order Date           

                                        Source                    

 

                                        Acetaminophen 300 Mg-Codeine 30 Mg Tablet Tylenol-Codeine #3 300 Mg-30 Mg Tablet

                                        Take 1 tablet by mouth every 4 hours as needed for Pain.

                            Oral                            Active                   

                                                                            2018            

                                        [facility]                    

 

                                        acetaminophen-codeine (TYLENOL/CODEINE #3) 300-30 mg per tablet                 

                                        Take 1 tablet by mouth every 4 hours as needed for Pain.                

                    Oral                            Active                                     

                                                2018                            [facility]                    



                                                                                
                       



Allergies, Adverse Reactions, Alerts

                    





                    Substance                            Category                            Reaction   

                          Severity                            Reaction type           

                          Status                            Date Reported                     

                          Comments                            Source                    



                                                                



Immunizations

                    





                    Immunization                            Date Given                            Site  

                          Status                            Last Updated             

                          Comments                            Source                    



                                                                        



Results

                    





                    Order Name                            Results                            Value      

                          Reference Range                            Date                

                          Interpretation                            Comments                       

                                        Source                    

 

                                                XRAY RIBS UNILATERAL 2 VIEWS                            





IMPRESSION:



No displaced rib fracture.



 



Dictated By: Omi Hernandez MD, 2018 5:04 AM



 



I have reviewed the study and agree with the findings in this report.



 



Signed By: Erin Bennett MD, 2018 5:17 AM



 



EXAM: Right rib series: 4 view(s)



 



HISTORY: right rib pain



COMPARISON: Chest x-ray on 2018.



 



DISCUSSION:



 



No acute displaced fracture.



No pneumothorax. 



Prominent right epicardial fat pad.Right middle lobe density and lungs



better assessed on prior chest two view xray.



The soft tissues are unremarkable.



 



Interface, Rad/Mammog In - 2018  5:22 AM CDT



EXAM: Right rib series: 4 view(s)



HISTORY: right rib pain

COMPARISON: Chest x-ray on 2018.



DISCUSSION:



No acute displaced fracture.

No pneumothorax. 

Prominent right epicardial fat pad.  Right middle lobe density and lungs

better assessed on prior chest two view xray.

The soft tissues are unremarkable.



IMPRESSION

IMPRESSION:

No displaced rib fracture.



Dictated By: Omi Hernandez MD, 2018 5:04 AM



I have reviewed the study and agree with the findings in this report.



Signed By: Erin Bennett MD, 2018 5:17 AM

                                                          2018                 

                                                                            [facility]       

             

 

                                                XRAY CHEST 2 VIEWS                            



IMPRESSION: 



Mild irregularity of the right seventh rib may be due to mildly



displaced fracture. Recommend further evaluation with right-sided ribs



series.



 



Right middle lobe airspace opacity obscuring the right heart border due



to atelectasis, contusion, or pneumonia. Recommend follow-up chest



radiograph in 4-6 weeks.



 



 



Discussed findings with Dr. Menezes at 8:00 PM on 2018.



 



Dictated By: Omi Hernandez MD, 2018 8:02 PM



 



I have reviewed the study and agree with the findings in this report.



 



Signed By: Tiffani Veloz MD, 2018 8:03 PM



 



EXAMINATION:XRAY CHEST 2 VIEWS



 



INDICATION: right rib pain



 



COMPARISON:None



 



FINDINGS:



TUBES and LINES:None.



 



LUNGS:Right middle lobe airspace opacity obscuring the right heart



border. Bibasilar segmental atelectasis.



 



PLEURA:No pleural effusion or pneumothorax.



 



HEART AND MEDIASTINUM: Bilateral opacified basilar heart borders. The



cardiac silhouette silhouette is mildly enlarged.



 



BONES AND SOFT TISSUES:Mild irregularity of the lateral aspect of the



right seventh rib.Soft tissues are unremarkable.



 



UPPER ABDOMEN: No free air under the diaphragm.



 



Interface, Rad/Mammog In - 2018  8:09 PM CDT



EXAMINATION:  XRAY CHEST 2 VIEWS    



INDICATION: right rib pain  



COMPARISON:  None

     

FINDINGS:

TUBES and LINES:  None.



LUNGS:  Right middle lobe airspace opacity obscuring the right heart

border. Bibasilar segmental atelectasis.



PLEURA:  No pleural effusion or pneumothorax.



HEART AND MEDIASTINUM: Bilateral opacified basilar heart borders. The

cardiac silhouette silhouette is mildly enlarged.



BONES AND SOFT TISSUES:  Mild irregularity of the lateral aspect of the

right seventh rib.  Soft tissues are unremarkable.



UPPER ABDOMEN: No free air under the diaphragm.    



IMPRESSION

IMPRESSION: 

Mild irregularity of the right seventh rib may be due to mildly

displaced fracture. Recommend further evaluation with right-sided ribs

series.



Right middle lobe airspace opacity obscuring the right heart border due

to atelectasis, contusion, or pneumonia. Recommend follow-up chest

radiograph in 4-6 weeks.





Discussed findings with Dr. Menezes at 8:00 PM on 2018.



Dictated By: Omi Hernandez MD, 2018 8:02 PM



I have reviewed the study and agree with the findings in this report.



Signed By: Tiffani Veloz MD, 2018 8:03 PM

                                                          2018                 

                                                                            [facility]       

             



                                                                                
                               



Vital Signs

                    





                    Vital Sign                            Value                            Date         

                          Comments                            Source                    

 

                    Systolic (mm Hg)                            168                             2018

                                                        [facility]                

    

 

                    Diastolic (mm Hg)                            98                             2018

                                                        [facility]                

    

 

                    Heart Rate                            100                             2018    

                                                      [facility]                    



 

                          Temperature Oral (F)                            36.39 Angela                         

                    2018                                                        [facility]     

               

 

                    Respitory Rate                            18                             2018 

                                                       [facility]                 

   



                                                                                
                                                                       



Encounters

                    





                    Location                            Location Details                            Encounter

 Type                            Encounter Number                            Reason For

 Visit                            Attending Provider                            ADM Date

                            DC Date                            Status                

                                        Source                    

 

                    [facility]                                                        Emergency

                            [id number]                                                

                          Francesco Bowen MD                            2018                                                        [facility]   

                 



                                                                                
   



Procedures

                    





                    Procedure                            Code                            Date           

                          Perfomer                            Comments                        

                                        Source                    

 

                          XRAY RIBS UNILATERAL 2 VIEWS                            [id number]                     

                    2018                            Jessi                                      

                                        [facility]                    

 

                    XRAY CHEST 2 VIEWS                            [id number]                            2018

                            [facility]

## 2019-01-14 NOTE — XMS REPORT
Clinical Summary

                             Created on: 2019



Casey Garciaaj Sudhir

External Reference #: [id number]

: 1970

Sex: Male



Demographics







                          Address                   [address]  [place]  [address]

 

                          Home Phone                [phone]

 

                          Preferred Language        English

 

                          Marital Status            

 

                          Sabianism Affiliation     Unknown

 

                          Race                      White

 

                          Ethnic Group              Non-





Author







                          Author                    [facility]

 

                          Organization              [facility]

 

                          Address                   Unknown

 

                          Phone                     Unavailable







Support







                Name            Relationship    Address         Phone

 

                Marry Garcia    [employer]            Unknown         [phone]







Care Team Providers







                    Care Team Member Name    Role                Phone

 

                    Gilford, Timberly MD    PCP                 [phone]







Allergies







                                        Comments



                 Active Allergy     Reactions       Severity        Noted Date 

 

                                        



"severe depression"



                 Rofecoxib       Other (See      High            2018 



                                         Comments)   







Medications







                          End Date                  Status



              Medication     Sig          Dispensed     Refills      Start  



                                         Date  

 

                          2018                Discontinued



                 clonIDINE (CATAPRES) 0.1     TAKE 1 TABLET      2                 



                     MG tablet           BY MOUTH            8  



                                         EVERY DAY AS     



                                         NEEDED FOR     



                                         BLOOD     



                                         PRESSURE     



                                         >170/110     

 

                          2018                Discontinued



                     dextroamphetamine-ampheta     Adderall            0   



                                         mine (ADDERALL) 20 mg      



                                         tablet      

 

                          2018                Discontinued



                     lisinopril          Take 20 mg by       0   



                           (PRINIVIL,ZESTRIL) 20 mg     mouth daily.     



                                         tablet      

 

                          10/09/2018                



              sucralfate (CARAFATE) 100     Take 10 mL (1     1200 mL      0              



                     mg/mL suspension     g total) by         8  



                                         mouth 4     



                                         (four) times     



                                         a day for 30     



                                         days.     

 

                          10/09/2018                



              lansoprazole (PREVACID)     Take 1       30 capsule     0              



                     30 MG capsule       capsule (30         8  



                                         mg total) by     



                                         mouth daily     



                                         for 30 days.     

 

                          2018                



              famotidine (PEPCID) 20 MG     Take 1 tablet     30 tablet     0              



                     tablet              (20 mg total)       8  



                                         by mouth 2     



                                         (two) times a     



                                         day for 15     



                                         days.     

 

                          2019                



              lisinopril     Take 2       60 tablet     0              



                     (PRINIVIL,ZESTRIL) 20 mg     tablets (40         8  



                           tablet                    mg total) by     



                                         mouth daily     



                                         for 30 days.     

 

                          2019                



              thiamine (vitamin B-1)     Take 1 tablet     30 tablet     0              



                     100 MG tablet       (100 mg             8  



                                         total) by     



                                         mouth daily     



                                         for 30 days.     

 

                          2018                



              traMADol 100 mg     Take 100 mg     4 capsule     0              



                     capsule,ER biphase 24 hr     by mouth            8  



                           25-75                     daily for 4     



                                         days.     

 

                          2018                



              sucralfate (CARAFATE) 100     Take 10 mL (1     200 mL       0              



                     mg/mL suspension     g total) by         8  



                                         mouth 4     



                                         (four) times     



                                         a day before     



                                         meals and     



                                         nightly for 5     



                                         days.     

 

                          2018                



              acetaminophen-codeine     Take 1 tablet     14 tablet     0              



                     (TYLENOL WITH CODEINE #3)     by mouth            8  



                           300-30 mg per tablet      every 6 (six)     



                                         hours as     



                                         needed for     



                                         moderate pain     



                                         for up to 5     



                                         days.     

 

                          2018                



              metroNIDAZOLE (FLAGYL)     Take 1 tablet     30 tablet     0              



                     500 MG tablet       (500 mg             8  



                                         total) by     



                                         mouth 3     



                                         (three) times     



                                         a day for 10     



                                         days.     







Active Problems







 



                           Problem                   Noted Date

 

 



                           Clostridium difficile infection     2018







Encounters







                          Care Team                 Description



                     Date                Type                Specialty  

 

                                        



[facility]        



                     [facility]          Orders Only         Internal Medicine  

 

                                        



Rehrer, DO Dilia Rodriguez Martina C., MD                 Intractable abdominal pain (Primary Dx); 

Essential hypertension



                     2018          Emergency           General Internal Medicine  



                                         -    



                                         2018    

 

                                        



Clark Abarca MD                Abdominal pain, unspecified abdominal location (Primary

 Dx)



                     2018          Emergency           Emergency Medicine  



after 2018



Social History







                                        Date



                 Tobacco Use     Types           Packs/Day       Years Used 

 

                                        Quit: 2018



                           Former Smoker             1 

 

    



                                         Smokeless Tobacco: Former   



                                         User   









   



                 Alcohol Use     Drinks/Week     oz/Week         Comments

 

   



                           Yes                       - last drink yesterday yong









 



                           Sex Assigned at Birth     Date Recorded

 

 



                                         Not on file 









                                        Industry



                           Job Start Date            Occupation 

 

                                        Not on file



                           Not on file               Not on file 









                                        Travel End



                           Travel History            Travel Start 

 





                                         No recent travel history available.







Last Filed Vital Signs







                                        Time Taken



                           Vital Sign                Reading 

 

                                        2018  2:18 PM CST



                           Blood Pressure            163/98 

 

                                        2018 12:33 PM CST



                           Pulse                     89 

 

                                        2018 12:33 PM CST



                           Temperature               35.8 C (96.5 F) 

 

                                        2018 12:33 PM CST



                           Respiratory Rate          18 

 

                                        2018 12:33 PM CST



                           Oxygen Saturation         95% 

 

                                        -



                           Inhaled Oxygen            - 



                                         Concentration  

 

                                        2018 10:13 AM CST



                           Weight                    104 kg (230 lb) 

 

                                        2018 10:13 AM CST



                           Height                    188 cm (6' 2") 

 

                                        2018 10:13 AM CST



                           Body Mass Index           29.53 







Plan of Treatment







   



                 Health Maintenance     Due Date        Last Done       Comments

 

   



                           INFLUENZA VACCINE         2018  







Procedures







                                        Comments



                 Procedure Name     Priority        Date/Time       Associated Diagnosis 

 

                                        



Results for this procedure are in the results section.



                     GASTROINTESTINAL PANEL     Routine             2018  



                                         11:00 AM CST  

 

                                        



Results for this procedure are in the results section.



                     LACTIC ACID LEVEL     Routine             2018  



                                         8:05 AM CST  

 

                                        



Results for this procedure are in the results section.



                     ESTIMATED GFR       Routine             2018  



                                         4:00 AM CST  

 

                                        



Results for this procedure are in the results section.



                     BASIC METABOLIC PANEL     Routine             2018  



                                         4:00 AM CST  

 

                                        



Results for this procedure are in the results section.



                     HC COMPLETE BLD COUNT     Routine             2018  



                           W/AUTO DIFF               4:00 AM CST  

 

                                        



Results for this procedure are in the results section.



                     LACTIC ACID LEVEL     Routine             2018  



                                         12:30 AM CST  

 

                                        



Results for this procedure are in the results section.



                     XR CHEST 1 VW PORTABLE     STAT                2018  



                                         9:21 PM CST  

 

                                        



Results for this procedure are in the results section.



                     ECG 12-LEAD         STAT                2018  



                                         8:47 PM CST  

 

                                        



Results for this procedure are in the results section.



                     LACTIC ACID LEVEL     STAT                2018  



                                         6:24 PM CST  

 

                                        



Results for this procedure are in the results section.



                     TROPONIN            STAT                2018  



                                         6:24 PM CST  

 

                                        



Results for this procedure are in the results section.



                     LACTIC ACID LEVEL, SEPSIS     Timed               2018  



                           - NOW AND REPEAT 2X EVERY      5:32 PM CST  



                                         3 HOURS    

 

                                        



Results for this procedure are in the results section.



                     LACTIC ACID LEVEL, SEPSIS     Timed               2018  



                           - NOW AND REPEAT 2X EVERY      2:11 PM CST  



                                         3 HOURS    

 

                                        



Results for this procedure are in the results section.



                     AL CRITICAL CARE, E/M     Routine             2018  



                           30-74 MINUTES             2:08 PM CST  

 

                                        



Results for this procedure are in the results section.



                     URINALYSIS          STAT                2018  



                                         2:06 PM CST  

 

                                        



Results for this procedure are in the results section.



                     GRAM STAIN          Routine             2018  



                                         2:06 PM CST  

 

                                        



Results for this procedure are in the results section.



                     URINE CULTURE       Routine             2018  



                                         2:06 PM CST  

 

                                        



Results for this procedure are in the results section.



                     CT ABDOMEN PELVIS W     STAT                2018  



                           CONTRAST                  11:21 AM CST  

 

                                        



Results for this procedure are in the results section.



                     ESTIMATED GFR       STAT                2018  



                                         10:07 AM CST  

 

                                        



Results for this procedure are in the results section.



                     LACTIC ACID LEVEL, SEPSIS     STAT                2018  



                           - NOW AND REPEAT 2X EVERY      10:07 AM CST  



                                         3 HOURS    

 

                                        



Results for this procedure are in the results section.



                     AMYLASE LEVEL       STAT                2018  



                                         10:07 AM CST  

 

                                        



Results for this procedure are in the results section.



                     LIPASE LEVEL        STAT                2018  



                                         10:07 AM CST  

 

                                        



Results for this procedure are in the results section.



                     COMPREHENSIVE METABOLIC     STAT                2018  



                           PANEL                     10:07 AM CST  

 

                                        



Results for this procedure are in the results section.



                     HC COMPLETE BLD COUNT     STAT                2018  



                           W/AUTO DIFF               10:07 AM CST  

 

                                        



Results for this procedure are in the results section.



                     CT ABDOMEN PELVIS W     STAT                2018  



                           CONTRAST                  11:50 AM CDT  

 

                                        



Results for this procedure are in the results section.



                     HC COMPLETE BLD COUNT     STAT                2018  



                           W/AUTO DIFF               10:46 AM CDT  

 

                                        



Results for this procedure are in the results section.



                     ZZESTIMATED GFR     STAT                2018  



                                         10:43 AM CDT  

 

                                        



Results for this procedure are in the results section.



                     LIPASE LEVEL        STAT                2018  



                                         10:43 AM CDT  

 

                                        



Results for this procedure are in the results section.



                     AMYLASE LEVEL       STAT                2018  



                                         10:43 AM CDT  

 

                                        



Results for this procedure are in the results section.



                     HEPATIC FUNCTION PANEL     STAT                2018  



                                         10:43 AM CDT  

 

                                        



Results for this procedure are in the results section.



                     BASIC METABOLIC PANEL     STAT                2018  



                                         10:43 AM CDT  



after 2018



Results

* Gastrointestinal panel (2018 11:00 AM CST)





   



                 Component       Value           Ref Range       Performed At

 

   



                     Gastrointestinal panel     Positive for C. difficile      [facility]



                                         -----------------------  



                                         Negative for all other  



                                         pathogens tested:  



                                         Negative for Salmonella  



                                         Negative for Campylobacter  



                                         Negative for Diarrheagenic E  



                                         coli/Shigella  



                                         Negative for Shiga-like  



                                         toxin-producing E coli  



                                         Negative for Plesiomonas  



                                         shigelloides  



                                         Negative for Yersinia  



                                         enterocolitica  



                                         Negative for Vibrio species  



                                         Negative for Cryptosporidium  



                                         Negative for Giardia lamblia  



                                         Negative for Cyclospora  



                                         cayeteanensis  



                                         Negative for Entamoeba  



                                         histolytica  



                                         Negative for Adenovirus F  



                                         40/41  



                                         Negative for Astrovirus  



                                         Negative for Norovirus GI/GII  



                                         Negative for Rotavirus A  



                                         Negative for Sapovirus  



                                         Negative for E coli 0157  



                                         This real-time PCR assay  



                                         detects the presence of  



                                         nucleic  



                                         acids (RNA or DNA) for the  



                                         gastrointestinal pathogens  



                                         listed.  



                                         A result of "Not-detected"  



                                         does not exclude the  



                                         possibility  



                                         of the presence of one or more  



                                         pathogens at concentrations  



                                         less than the detectable  



                                         limits of the assay.  



                                         (A)  



                                         Comment:  



                                         Specimen Information  



                                         Specimen Source: Stool  



                                         Specimen Site: Nonpreserved  













                                         Specimen

 





                                         Stool - Nonpreserved









   



                 Performing Organization     Address         City/[facility]     Phone Number

 

   



                     [facility] [place] [address] 



                                         PATHOLOGY [address]   





* Lactic acid level (2018  8:05 AM CST)



Only the most recent of 3 results within the time period is included.





   



                 Component       Value           Ref Range       Performed At

 

   



                 Lactic acid     1.5             0.5 - 2.2 mmol/L     [facility]













                                         Specimen

 





                                         Blood









   



                 Performing Organization     Address         City/State/[facility]     Phone Number

 

   



                     [facility] [place] [address] 



                                         PATHOLOGY [address]   





* Estimated GFR (2018  4:00 AM CST)



Only the most recent of 2 results within the time period is included.





   



                 Component       Value           Ref Range       Performed At

 

   



                 Estimated GFR     >=90            mL/min/1.73 m2     [facility]



                           Comment:                  HOSPITAL



                                         CatergoryUnitsInte  



                                         rpretation  



                                         G1  



                                         >=90 Normal or high  



                                         G2  



                                         60-89Mildly decreased  



                                         [id number]  



                                         Mildly to moderately  



                                         decreased  



                                         [id number]  



                                         Moderately to severely  



                                         decreased  



                                         G4  



                                         15-29Severely  



                                         decreased  



                                         G5  



                                         <15Kidney failure  



                                         The eGFR was calculated using  



                                         the Chronic Kidney Disease  



                                         Epidemiology Collaboration  



                                         (CKD-EPI) equation.  



                                         Interpretation is based on  



                                         recommendations of the  



                                         [employer]  



                                         [employer]  



                                         Outcomes Quality  



                                         Initiative ([id number])  



                                         published in 2014.  













                                         Specimen

 





                                         Plasma specimen









   



                 Performing Organization     Address         City/[facility]/RUSTcode     Phone Number

 

   



                     [facility] [place] [address] 



                                         PATHOLOGY AND [facility]   



                                         MEDICINE   

 

   



                     [address]   





* CBC with platelet and differential (2018  4:00 AM CST)



Only the most recent of 3 results within the time period is included.





   



                 Component       Value           Ref Range       Performed At

 

   



                 WBC             6.42            4.50 - 11.00 k/uL     [facility]

 

   



                 RBC             3.84 (L)        4.40 - 6.00 m/uL     [facility]

 

   



                 HGB             11.9 (L)        14.0 - 18.0 g/dL     [facility]

 

   



                 HCT             35.2 (L)        41.0 - 51.0 %     [facility]

 

   



                 MCV             91.7            82.0 - 100.0 fL     [facility]

 

   



                 MCH             31.0            27.0 - 34.0 pg     [facility]

 

   



                 MCHC            33.8            31.0 - 37.0 g/dL     [facility]

 

   



                 RDW - SD        43.3            37.0 - 55.0 fL     [facility]

 

   



                 MPV             9.7             8.8 - 13.2 fL     [facility]

 

   



                 Platelet count     242             150 - 400 k/uL     [facility]

 

   



                 Nucleated RBC     0.00            /100 WBC        [facility]

 

   



                 Neutrophils     54.7            39.0 - 69.0 %     [facility]

 

   



                 Lymphocytes     31.8            25.0 - 45.0 %     [facility]

 

   



                 Monocytes       7.3             0.0 - 10.0 %     [facility]

 

   



                 Eosinophils     4.8             0.0 - 5.0 %     [facility]

 

   



                 Basophils       1.2 (H)         0.0 - 1.0 %     [facility]

 

   



                 Immature granulocytes     0.2Comment: "Immature     0.0 - 1.0 %     [facility]





                           granulocytes"  (promyelocytes,      HOSPITAL



                                         myelocytes, metamyelocytes)  













                                         Specimen

 





                                         Blood









   



                 Performing Organization     Address         City/State/Zipcode     Phone Number

 

   



                     [facility] DEPARTMENT OF     [address] [place] [address] 



                                         PATHOLOGY AND GENOMIC   



                                         MEDICINE   

 

   



                     [address]   





* Basic metabolic panel (2018  4:00 AM CST)



Only the most recent of 2 results within the time period is included.





   



                 Component       Value           Ref Range       Performed At

 

   



                 Sodium          139             135 - 148 mEq/L     [facility]

 

   



                 Potassium       3.6             3.5 - 5.0 mEq/L     [facility]

 

   



                 Chloride        101             98 - 112 mEq/L     [facility]

 

   



                 CO2             24              24 - 31 mEq/L     [facility]

 

   



                 Anion gap       14@ANIO         7 - 15 mEq/L     [facility]

 

   



                 BUN             11              6 - 20 mg/dL     [facility]

 

   



                 Creatinine      0.89            0.70 - 1.20 mg/dL     [facility]

 

   



                 Glucose         100 (H)         65 - 99 mg/dL     [facility]

 

   



                 Calcium         8.7             8.3 - 10.2 mg/dL     [facility]













                                         Specimen

 





                                         Plasma specimen









   



                 Performing Organization     Address         City/State/Zipcode     Phone Number

 

   



                     [facility] DEPARTMENT      [address] [place] [address] 



                                         PATHOLOGY AND GENOMIC   



                                         MEDICINE   

 

   



                     [facility]     [address] [place] [address] 



                                         HOSPITAL   





* XR Chest 1 Vw Portable (2018  9:21 PM CST)





 



                           Narrative                 Performed At

 

 



                           EXAMINATION:XR CHEST 1 VW PORTABLE      [facility]



                                         CLINICAL HISTORY:Shortness of breath 



                                         COMPARISON:2 



                                         IMPRESSION: 



                                         Cardiac silhouette enlarged. Pulmonary vasculature upper normal. Shallow 



                                         inspiratory depth. No lobar consolidation or pleural effusion identified on the

 



                                         frontal view(s). 



                                         [id number] 









                                        Procedure Note

 

                                        



 Interface, Radiology Results Incoming - 2018 10:11 PM CST



EXAMINATION:  XR CHEST 1 VW PORTABLE



CLINICAL HISTORY:  Shortness of breath



COMPARISON:  2 



IMPRESSION:

Cardiac silhouette enlarged. Pulmonary vasculature upper normal. Shallow 
inspiratory depth. No lobar consolidation or pleural effusion identified on the 
frontal view(s). 



[facility]-[id number]











   



                 Performing Organization     Address         City/[facility]/RUSTcode     Phone Number

 

   



                     [facility]          [address] [place] [address] 





* ECG 12 lead (2018  8:47 PM CST)





   



                 Component       Value           Ref Range       Performed At

 

   



                     Ventricular rate     88                  [facility]

 

   



                     Atrial rate         88                  [facility]

 

   



                     AL interval         178                 [facility]

 

   



                     QRSD interval       98                  [facility]

 

   



                     QT interval         374                 [facility]

 

   



                     QTC interval        452                 [facility]

 

   



                     P axis 1            54                  [facility]

 

   



                     QRS axis 1          1                   [facility]

 

   



                     T wave axis         26                  [facility]

 

   



                     EKG impression      Normal sinus rhythm-Moderate      [facility]



                                         voltage criteria for LVH, may  



                                         be normal variant-Nonspecific  



                                         T wave abnormality-Abnormal  



                                         ECG-In automated comparison  



                                         with ECG of 2013  



                                         22:02,-No significant change  



                                         was found-Electronically  



                                         Signed By Conner Ward

  



                                         ([id number]) on 2018 8:05:01  



                                         AM  









 



                           Narrative                 Performed At

 

 



                                         This result has an attachment that is not available. 









   



                 Performing Organization     Address         City/[facility]/Zipcode     Phone Number

 

   



                     [facility]            [address] [place] [address] 





* Troponin (2018  6:24 PM CST)





   



                 Component       Value           Ref Range       Performed At

 

   



                 Troponin        <0.30           0.00 - 0.30 ng/mL     [facility]



                           Comment:                  HOSPITAL



                                         0.30 - 1.49  



                                         ng/mlMay  



                                         indicate increased risk of  



                                         acute  



                                           



                                          coronary  



                                         syndrome.  



                                           



                                           



                                         >=1.5  



                                         ng/ml  



                                         Consistent with acute  



                                         myocardial  



                                           



                                          infarction.  



                                           



                                           



                                           



                                           



                                           



                                         The diagnostic value of a  



                                         single normal or  



                                         non-diagnostic  



                                         result is  



                                         questionable.Serial  



                                         samples at 2-6 hour intervals  



                                         are required to rule out acute  



                                         myocardial injury.  













                                         Specimen

 





                                         Plasma specimen









   



                 Performing Organization     Address         City/[facility]/Zipcode     Phone Number

 

   



                     [facility]     [address] [place] [address] 



                                         PATHOLOGY AND GENOMIC   



                                         MEDICINE   

 

   



                     [facility]     [address]   





* Lactic acid level, SEPSIS - Now and repeat 2x every 3 hours (2018  5:32 
  PM CST)



Only the most recent of 3 results within the time period is included.





   



                 Component       Value           Ref Range       Performed At

 

   



                 Lactic acid     2.8 (H)         0.5 - 2.2 mmol/L     [facility]













                                         Specimen

 





                                         Blood









   



                 Performing Organization     Address         City/[facility]/Zipcode     Phone Number

 

   



                     [facility]     [address] [place] [address] 



                           PATHOLOGY AND GENOMIC     [facility]  



                                         [facility]     [address]     [place] [address] 



                                         EMERGENCY CARE CENTER   





* CRITICAL CARE (2018  2:08 PM CST)





 



                           Narrative                 Performed At

 

 



                                         Silvino Sanchez [place] 20181:34 AM 



                                         Critical Care 



                                         Performed by: Silvino Sanchez DO 



                                         Authorized by: Silvino Sanchez DO 



                                         Critical care provider statement: 



                                         Critical care time (minutes):45 



                                         Critical care time was exclusive of:Separately billable procedures and 





                                         treating other patients 



                                         Critical care was necessary to treat or prevent imminent or 



                                         life-threatening deterioration of the following conditions:Circulatory 



                                         failure 



                                         Critical care was time spent personally by me on the following 



                                         activities:Blood draw for specimens, development of treatment plan with 



                                         patient or surrogate, discussions with consultants, evaluation of 



                                         patient's response to treatment, examination of patient, obtaining history 



                                         from patient or surrogate, review of old charts, re-evaluation of 



                                         patient's condition, pulse oximetry, ordering and review of radiographic 



                                         studies, ordering and review of laboratory studies and ordering and 



                                         performing treatments and interventions 



                                         Andrea 'yes' if you are taking over critical care for this patient from 



                                         another provider.: no 



                                         Comments: 



                                          The patient is critically ill due to but not limited to: ongoing 



                                         respiratory failure, high risk for respiratory failure, hemodynamic or 



                                         metabolic deterioration, altered mental status, and acute organ failure. 



                                         The patient is requiring frequent assessment, treatment, life-saving 



                                         devices, and prevention and management of life threatening conditions. 





* Urinalysis (2018  2:06 PM CST)





   



                 Component       Value           Ref Range       Performed At

 

   



                 Glucose, UA     Negative        Negative        [facility]

 

   



                 Bilirubin, UA     Negative        Negative        [facility]

 

   



                 Ketones, UA     Negative        Negative        [facility]

 

   



                 Specific gravity, UA     1.015           1.001 - 1.035     [facility]

 

   



                 Blood, UA       Negative        Negative        [facility]

 

   



                 pH, UA          6.0             5.0 - 8.5       [facility]

 

   



                 Protein, UA     Negative        Negative        [facility]

 

   



                 Urobilinogen, UA     <2.0            <2.0            [facility]

 

   



                 Nitrite, UA     Negative        Negative        [facility]

 

   



                 Leukocyte esterase, UA     Negative        Negative        [facility]

 

   



                     Color, UA           Yellow              [facility]

 

   



                     Appearance, UA      Clear               [facility]













                                         Specimen

 





                                         Urine









   



                 Performing Organization     Address         City/[facility]/Zipcode     Phone Number

 

   



                     [place] [id number] [place] [address] 



                           PATHOLOGY AND GENOMIC     [address] [place] [address] [address]     [place] [id number] 



                                         EMERGENCY CARE CENTER   





* Gram stain (2018  2:06 PM CST)





   



                 Component       Value           Ref Range       Performed At

 

   



                     Gram stain result     No WBC's or organisms seen.      [facility]



                           Comment:                  HOSPITAL



                                         Specimen Information  



                                         Specimen Source: Urine  



                                         Specimen Site: Urine, clean  



                                         catch  













                                         Specimen

 





                                         Urine - Urine, clean



                                         catch









   



                 Performing Organization     Address         City/State/Zipcode     Phone Number

 

   



                     [facility]     [address] [place] [address] 



                                         PATHOLOGY AND GENOMIC   



                                         MEDICINE   

 

   



                     [facility] [address] 



                                         [facility]   





* Urine culture (2018  2:06 PM CST)





   



                 Component       Value           Ref Range       Performed At

 

   



                     Urine culture isolate     Mixed ken <=10-3 col/cc      [facility]



                           Comment:                  HOSPITAL



                                         Specimen Information  



                                         Specimen Source: Urine  



                                         Specimen Site: Urine, clean  



                                         catch  













                                         Specimen

 





                                         Urine - Urine, clean



                                         catch









   



                 Performing Organization     Address         City/State/Zipcode     Phone Number

 

   



                     [facility] DEPARTMENT OF     [address] [place] [address] 



                                         PATHOLOGY AND GENOMIC   



                                         MEDICINE   

 

   



                     [facility]     [address] [place] [address] 



                                         HOSPITAL   





* CT Abdomen Pelvis W Contrast (2018 11:21 AM CST)



Only the most recent of 2 results within the time period is included.





 



                           Narrative                 Performed At

 

 



                           EXAMINATION:CT ABDOMEN PELVIS W CONTRAST      [facility]



                                         CLINICAL HISTORY:left sided abd pain 



                                         TECHNIQUE: Multiple axial images of the abdomen and pelvis were obtained 



                                         following intravenous administration of iodinated contrast. CT imaging was 



                                         performed with iterative reconstruction technique and/or automated exposure 



                                         control to reduce radiation 



                                         dose. 



                                         COMPARISON: CT abdomen and pelvis dated 2018 



                                         FINDINGS: 



                                         ABDOMEN: 



                                         No free air is seen in the abdomen. 



                                         1. Liver: The liver is normal. No focal mass. 



                                         2. Gallbladder: The gallbladder is normal. 



                                         3. Spleen: The spleen is not enlarged. 



                                         4. Pancreas: The pancreas is unremarkable. 



                                         5. Adrenal Glands: The adrenal glands are unremarkable. 



                                         6. Kidneys: There is a 1.7 cm simple peripelvic cysts in the left kidney which 





                                         is unchanged. There is a subcortical 5 mm hypodensity in the right kidney which

 



                                         is too small to characterize but unchanged and statistically likely representing

 



                                         a cyst. There 



                                         are some scarring involving the right kidney which is unchanged. No visible 



                                         mass. No renal stone, hydronephrosis, or evidence of hydroureter. 



                                         7. Abdominal Aorta: The abdominal aorta is nonaneurysmal. 



                                         8. Nodes: No enlarged retroperitoneal or mesenteric lymphadenopathy. 



                                         9. Bowel: Small to moderate amount of stool in the colon. There are a few 



                                         scattered colonic diverticula without findings to suggest acute diverticulitis.

 



                                         The appendix is normal. There is no evidence of a large or small bowel 



                                         obstruction. Stomach is 



                                         grossly unremarkable. 



                                         10. Ascites: No ascites or fluid collections. 



                                         PELVIS: 



                                         1.Pelvis: No mass, fluid collection or significant adenopathy. Bilateral 



                                         fat-containing inguinal hernias. 



                                         2. Bones: Degenerative changes of the osseous structures. No suspicious lesions.

 



                                         3. Lung Bases: Unremarkable 



                                         IMPRESSION: 



                                         No CT evidence of an acute abnormality in the abdomen or pelvis. 



                                         [facility]-[id number] 









                                        Procedure Note

 

                                        



 Interface, Radiology Results Incoming - 2018 11:34 AM CST



EXAMINATION:  CT ABDOMEN PELVIS W CONTRAST



CLINICAL HISTORY:  left sided abd pain



TECHNIQUE: Multiple axial images of the abdomen and pelvis were obtained 
following intravenous administration of iodinated contrast. CT imaging was 
performed with iterative reconstruction technique and/or automated exposure 
control to reduce radiation 

dose.



COMPARISON: CT abdomen and pelvis dated 2018



FINDINGS:



ABDOMEN:



No free air is seen in the abdomen.



                                        1. Liver: The liver is normal. No focal mass.



                                        2. Gallbladder: The gallbladder is normal.



                                        3. Spleen: The spleen is not enlarged.



                                        4. Pancreas: The pancreas is unremarkable.



                                        5. Adrenal Glands: The adrenal glands are unremarkable.



                                        6. Kidneys: There is a 1.7 cm simple peripelvic cysts in the left kidney which is

 unchanged. There is a subcortical 5 mm hypodensity in the right kidney which is
too small to characterize but unchanged and statistically likely representing a 
cyst. There 

are some scarring involving the right kidney which is unchanged. No visible 
mass. No renal stone, hydronephrosis, or evidence of hydroureter.



                                        7. Abdominal Aorta: The abdominal aorta is nonaneurysmal.



                                        8. Nodes: No enlarged retroperitoneal or mesenteric lymphadenopathy.



                                        9. Bowel: Small to moderate amount of stool in the colon. There are a few scattered

 colonic diverticula without findings to suggest acute diverticulitis. The 
appendix is normal. There is no evidence of a large or small bowel obstruction. 
Stomach is 

grossly unremarkable.



                                        10. Ascites: No ascites or fluid collections.



PELVIS:



                                        1.  Pelvis: No mass, fluid collection or significant adenopathy. Bilateral fat-containing

 inguinal hernias.



                                        2. Bones: Degenerative changes of the osseous structures. No suspicious lesions.





                                        3. Lung Bases: Unremarkable    



IMPRESSION:



No CT evidence of an acute abnormality in the abdomen or pelvis.



[facility]-[id number]











   



                 Performing Organization     Address         City/State/Zipcode     Phone Number

 

   



                     [facility]          [address] [place] [address] 





* Lipase level (2018 10:07 AM CST)



Only the most recent of 2 results within the time period is included.





   



                 Component       Value           Ref Range       Performed At

 

   



                 Lipase          46              13 - 60 U/L     [facility]













                                         Specimen

 





                                         Serum









   



                 Performing Organization     Address         City/State/Zipcode     Phone Number

 

   



                      DEPARTMENT OF     [address] [place] [address]., [place] [address] 



                           PATHOLOGY AND GENOMIC     [address]  



                                         MEDICINE, [place] [id number] [facility] [address]     [place] [address] 



                                         [facility]   





* Amylase level (2018 10:07 AM CST)



Only the most recent of 2 results within the time period is included.





   



                 Component       Value           Ref Range       Performed At

 

   



                 Amylase         66              28 - 100 U/L     [facility]













                                         Specimen

 





                                         Serum









   



                 Performing Organization     Address         City/[facility]/RUST[facility]     Phone Number

 

   



                     [place] [address] 



                           PATHOLOGY AND GENOMIC     [address] [place], [place] [address] [facility] [address]     [place] [address] 



                                         EMERGENCY CARE CENTER   





* Comprehensive metabolic panel (2018 10:07 AM CST)





   



                 Component       Value           Ref Range       Performed At

 

   



                 Sodium          139             135 - 148 mEq/L     [facility]

 

   



                 Potassium       3.9             3.5 - 5.0 mEq/L     [facility]

 

   



                 Chloride        107             98 - 112 mEq/L     [facility]

 

   



                 CO2             23 (L)          24 - 31 mEq/L     [facility]

 

   



                 Anion gap       9@ANIO          7 - 15 mEq/L     [facility]

 

   



                 BUN             12              6 - 20 mg/dL     [facility]

 

   



                 Creatinine      0.93            0.70 - 1.20 mg/dL     [facility]

 

   



                 Glucose         112 (H)         65 - 99 mg/dL     [facility]

 

   



                 Calcium         9.3             8.3 - 10.2 mg/dL     [facility]

 

   



                 Protein         7.2             6.3 - 8.3 g/dL     [facility]

 

   



                 Albumin         4.3             3.5 - 5.0 g/dL     [facility]

 

   



                 A/G ratio       1.5             0.7 - 3.8       [facility]

 

   



                 Alkaline phosphatase     74              40 - 129 U/L     [facility]

 

   



                 AST             25              10 - 50 U/L     [facility]

 

   



                 ALT             27              5 - 50 U/L      [facility]

 

   



                 Total bilirubin     0.3             0.0 - 1.2 mg/dL     [facility]













                                         Specimen

 





                                         Plasma specimen









   



                 Performing Organization     Address         City/[facility]     Phone Number

 

   



                     [address] [address] 



                           PATHOLOGY AND GENOMIC     [address] [place], [place] [address] [facility] [address]     [place] [address] 



                                         EMERGENCY CARE CENTER   





* Estimated GFR (2018 10:43 AM CDT)





   



                 Component       Value           Ref Range       Performed At

 

   



                 GFR Non Af Amer     80              mL/min/1.73 m2      DEPARTMENT OF



                                         PATHOLOGY AND



                                         GENOMIC MEDICINE,



                                         [facility]

 

   



                 GFR Af Amer     >90             mL/min/1.73 m2      DEPARTMENT OF



                           Comment:                  PATHOLOGY AND



                           Chronic kidney disease: <60      GENOMIC MEDICINE,



                           mL/min/1.73m2             [place] [facility]



                           Kidney failure: <15       [facility]



                                         mL/min/1.73m2  



                                         The estimated GFR is  



                                         calculated from the  



                                         IDMS-traceable Modification of  



                                         Diet  



                                         in Renal Disease Equation. The  



                                         accuracy of the calculation is  



                                         poor when the  



                                         creatinine is normal.  



                                         Calculated values >90  



                                         mL/min/1.73m2 are not  



                                         reported.  



                                         This equation has not been  



                                         validated in children (<18  



                                         years), pregnant  



                                         women, the elderly (>70  



                                         years), or ethnic groups other  



                                         than Caucasians and  



                                          Americans.  













                                         Specimen

 





                                         Plasma specimen









   



                 Performing Organization     Address         City/[facility]/RUSTcode     Phone Number

 

   



                     [place] [address] 



                                         PATHOLOGY AND GENOMIC   



                                         [facility]   





* Hepatic function panel (2018 10:43 AM CDT)





   



                 Component       Value           Ref Range       Performed At

 

   



                 Albumin         3.4             3.3 - 5.5 g/dL      DEPARTMENT OF



                                         [facility]



                                         [facility]

 

   



                 Alkaline phosphatase     53              53 - 128 U/L      DEPARTMENT OF



                                         [facility]Y [facility]



                                         [facility]

 

   



                 ALT             56 (H)          10 - 47 U/L      DEPARTMENT OF



                                         [facility]Y [facility]



                                         [facility]

 

   



                 AST             48 (H)          11 - 38 U/L      DEPARTMENT OF



                                         [facility]Y [facility]



                                         [facility]

 

   



                 Bilirubin direct     0.2             0.0 - 0.3 mg/dL      DEPARTMENT OF



                                         PATHOLOGY [facility]



                                         [facility]

 

   



                 Total bilirubin     0.7             0.2 - 1.6 mg/dL      DEPARTMENT OF



                                         PATHOLOGY [facility]



                                         [facility]

 

   



                 Protein         7.7             6.4 - 8.1 g/dL      DEPARTMENT OF



                                         PATHOLOGY AND



                                         [facility]













                                         Specimen

 





                                         Plasma specimen









   



                 Performing Organization     Address         City/State/Zipcode     Phone Number

 

   



                     [place] [address] 



                                         PATHOLOGY AND GENOMIC   



                                         [facility]   





after 2018



Advance Directives





Patient has advance care planning documents on file. For more information, bianka evangelista contact:



Ramesh Neil



[address] [place] [address]

## 2019-01-14 NOTE — XMS REPORT
Clinical Summary

                             Created on: 2019



JC PRESSLEY

External Reference #: [id number]

: 1970

Sex: Male



Demographics







                          Address                   [address] [place]  [address]

 

                          Home Phone                [phone]

 

                          Preferred Language        Unknown

 

                          Marital Status            

 

                          Catholic Affiliation     PRS

 

                          Race                      Unknown

 

                          Ethnic Group              Unknown





Author







                          Author                    [facility]

 

                          Organization              [facility]

 

                          Address                   Unknown

 

                          Phone                     Unavailable







Support







                Name            Relationship    Address         Phone

 

                    Jose Zeus        PEREZ                [address]

[place]  [address]                      [phone]







Care Team Providers







                    Care Team Member Name    Role                Phone

 

                          PCP                       Unavailable







Allergies

No Known Allergies



Medications







                          End Date                  Status



              Medication     Sig          Dispensed     Refills      Start  



                                         Date  

 

                                                    Active



              acetaminophen-codeine     Take 1 tablet     20 tablet     0              



                     (TYLENOL/CODEINE #3)     by mouth            8  



                           300-30 mg per             every 4 hours     



                           tabletIndications: Rib     as needed for     



                           pain on right side        Pain.     







Active Problems







 



                           Problem                   Noted Date

 

 



                           Rib pain on right side     2018







Encounters







                          Care Team                 Description



                     Date                Type                Specialty  

 

                                        



Francesco Bowen MD                    Rib pain on right side (Primary Dx)



                     2018          Emergency           Emergency Medicine  



                                         -    



                                         2018    



after 2018



Social History







                                        Date



                 Tobacco Use     Types           Packs/Day       Years Used 

 

                                         



                                         Current Every Day Smoker    

 

    



                                         Smokeless Tobacco: Never   



                                         Used   









   



                 Alcohol Use     Drinks/Week     oz/Week         Comments

 

   



                                         No   









 



                           Sex Assigned at Birth     Date Recorded

 

 



                                         Not on file 









                                        Industry



                           Job Start Date            Occupation 

 

                                        Not on file



                           Not on file               Not on file 









                                        Travel End



                           Travel History            Travel Start 

 





                                         No recent travel history available.







Last Filed Vital Signs







                                        Time Taken



                           Vital Sign                Reading 

 

                                        2018  6:00 AM CDT



                           Blood Pressure            168/98 

 

                                        2018  6:00 AM CDT



                           Pulse                     100 

 

                                        2018  6:00 AM CDT



                           Temperature               36.4 C (97.5 F) 

 

                                        2018  6:00 AM CDT



                           Respiratory Rate          18 

 

                                        2018  6:00 AM CDT



                           Oxygen Saturation         98% 

 

                                        -



                           Inhaled Oxygen            - 



                                         Concentration  

 

                                        -



                           Weight                    - 

 

                                        -



                           Height                    - 

 

                                        -



                           Body Mass Index           - 







Plan of Treatment







   



                 Health Maintenance     Due Date        Last Done       Comments

 

   



                           IMM Influenza Seasonal     10/01/2018  



                                         Oct to March (>/=19 yrs)   







Procedures







                                        Comments



                 Procedure Name     Priority        Date/Time       Associated Diagnosis 

 

                                        



Results for this procedure are in the results section.



                 XRAY RIBS UNILATERAL 2     STAT            2018      Rib pain on right side 



                           VIEWS                     4:13 AM CDT  

 

                                        



Results for this procedure are in the results section.



                 XRAY CHEST 2 VIEWS     STAT            2018      Rib pain on right side 



                                         6:17 PM CDT  



after 2018



Results

* XRAY RIBS UNILATERAL 2 VIEWS (2018  4:13 AM CDT)





 



                           Impressions               Performed At

 

 



                           IMPRESSION:               [facility]



                                         No displaced rib fracture. 



                                         Dictated By: Omi Hernandez MD, 2018 5:04 AM 



                                         I have reviewed the study and agree with the findings in this report. 



                                         Signed By: Erin Bennett MD, 2018 5:17 AM 









 



                           Narrative                 Performed At

 

 



                           EXAM: Right rib series: 4 view(s)     [facility]



                                         HISTORY: right rib pain 



                                         COMPARISON: Chest x-ray on 2018. 



                                         DISCUSSION: 



                                         No acute displaced fracture. 



                                         No pneumothorax. 



                                         Prominent right epicardial fat pad.Right middle lobe density and lungs 



                                         better assessed on prior chest two view xray. 



                                         The soft tissues are unremarkable. 









                                        Procedure Note

 

                                        



Interface, Rad/Mammog In - 2018  5:22 AM CDT



EXAM: Right rib series: 4 view(s)



HISTORY: right rib pain

COMPARISON: Chest x-ray on 2018.



DISCUSSION:



No acute displaced fracture.

No pneumothorax. 

Prominent right epicardial fat pad.  Right middle lobe density and lungs

better assessed on prior chest two view xray.

The soft tissues are unremarkable.



IMPRESSION

IMPRESSION:

No displaced rib fracture.



Dictated By: Omi Hernandez MD, 2018 5:04 AM



I have reviewed the study and agree with the findings in this report.



Signed By: Erin Bennett MD, 2018 5:17 AM











   



                 Performing Organization     Address         City/State/Zipcode     Phone Number

 

   



                                         [facility]   





* XRAY CHEST 2 VIEWS (2018  6:17 PM CDT)





 



                           Impressions               Performed At

 

 



                           IMPRESSION:               [facility]



                                         Mild irregularity of the right seventh rib may be due to mildly 



                                         displaced fracture. Recommend further evaluation with right-sided ribs 



                                         series. 



                                         Right middle lobe airspace opacity obscuring the right heart border due 



                                         to atelectasis, contusion, or pneumonia. Recommend follow-up chest 



                                         radiograph in 4-6 weeks. 



                                         Discussed findings with Dr. Menezes at 8:00 PM on 2018. 



                                         Dictated By: Omi Hernandez MD, 2018 8:02 PM 



                                         I have reviewed the study and agree with the findings in this report. 



                                         Signed By: Tiffani Veloz MD, 2018 8:03 PM 









 



                           Narrative                 Performed At

 

 



                           EXAMINATION:XRAY CHEST 2 VIEWS     [facility]



                                         INDICATION: right rib pain 



                                         COMPARISON:None 



                                          



                                         FINDINGS: 



                                         TUBES and LINES:None. 



                                         LUNGS:Right middle lobe airspace opacity obscuring the right heart 



                                         border. Bibasilar segmental atelectasis. 



                                         PLEURA:No pleural effusion or pneumothorax. 



                                         HEART AND MEDIASTINUM: Bilateral opacified basilar heart borders. The 



                                         cardiac silhouette silhouette is mildly enlarged. 



                                         BONES AND SOFT TISSUES:Mild irregularity of the lateral aspect of the 



                                         right seventh rib.Soft tissues are unremarkable. 



                                         UPPER ABDOMEN: No free air under the diaphragm. 









                                        Procedure Note

 

                                        



Interface, Rad/Mammog In - 2018  8:09 PM CDT



EXAMINATION:  XRAY CHEST 2 VIEWS    



INDICATION: right rib pain  



COMPARISON:  None

     

FINDINGS:

TUBES and LINES:  None.



LUNGS:  Right middle lobe airspace opacity obscuring the right heart

border. Bibasilar segmental atelectasis.



PLEURA:  No pleural effusion or pneumothorax.



HEART AND MEDIASTINUM: Bilateral opacified basilar heart borders. The

cardiac silhouette silhouette is mildly enlarged.



BONES AND SOFT TISSUES:  Mild irregularity of the lateral aspect of the

right seventh rib.  Soft tissues are unremarkable.



UPPER ABDOMEN: No free air under the diaphragm.    



IMPRESSION

IMPRESSION: 

Mild irregularity of the right seventh rib may be due to mildly

displaced fracture. Recommend further evaluation with right-sided ribs

series.



Right middle lobe airspace opacity obscuring the right heart border due

to atelectasis, contusion, or pneumonia. Recommend follow-up chest

radiograph in 4-6 weeks.





Discussed findings with Dr. Menezes at 8:00 PM on 2018.



Dictated By: Omi Hernandez MD, 2018 8:02 PM



I have reviewed the study and agree with the findings in this report.



Signed By: Tiffani Veloz MD, 2018 8:03 PM











   



                 Performing Organization     Address         City/State/Zipcode     Phone Number

 

   



                                         [facility]   





after 2018



Insurance







                                        Type



            Payer      Benefit     Subscriber ID     Effective     Phone      Address 



                           Plan /                    Dates   



                                         Group     

 

                                         



            [facility] SELF-PAY     [facility]       xxxxxxxxx     2018-P     [phone]     [address] [place] [address] 









     



            Guarantor Name     Account     Relation to     Date of     Phone      Billing Address



                     Type                Patient             Birth  

 

     



            Jc Pressley     Personal/F     Self       1970     [phone] [address]               (Home)              [place]



                           [phone]              [place] [address]



                                         (Work)

## 2019-01-14 NOTE — DIAGNOSTIC IMAGING REPORT
Examination: Single AP view of the chest.



COMPARISON: Portable chest 1/5/2019



INDICATION: Right-sided chest pain

    

IMPRESSION:

 

1.  Lines and Tubes: None

2.  Lungs are grossly clear.  No consolidation or effusion.

3.  Stable prominence of the cardiac silhouette, which may be partly due to AP

projection.   Pulmonary vasculature is normal.

4.  No acute bony abnormalities.



Signed by: Dr. Iron Travis M.D. on 1/14/2019 8:45 PM